# Patient Record
Sex: FEMALE | Race: WHITE | NOT HISPANIC OR LATINO
[De-identification: names, ages, dates, MRNs, and addresses within clinical notes are randomized per-mention and may not be internally consistent; named-entity substitution may affect disease eponyms.]

---

## 2019-03-26 ENCOUNTER — TRANSCRIPTION ENCOUNTER (OUTPATIENT)
Age: 24
End: 2019-03-26

## 2019-05-07 ENCOUNTER — TRANSCRIPTION ENCOUNTER (OUTPATIENT)
Age: 24
End: 2019-05-07

## 2019-05-11 ENCOUNTER — TRANSCRIPTION ENCOUNTER (OUTPATIENT)
Age: 24
End: 2019-05-11

## 2020-04-14 ENCOUNTER — INPATIENT (INPATIENT)
Facility: HOSPITAL | Age: 25
LOS: 1 days | Discharge: HOME | DRG: 373 | End: 2020-04-16
Attending: TRANSPLANT SURGERY | Admitting: TRANSPLANT SURGERY
Payer: COMMERCIAL

## 2020-04-14 VITALS
SYSTOLIC BLOOD PRESSURE: 108 MMHG | RESPIRATION RATE: 18 BRPM | TEMPERATURE: 99 F | HEART RATE: 105 BPM | OXYGEN SATURATION: 97 % | HEIGHT: 62 IN | WEIGHT: 134.92 LBS | DIASTOLIC BLOOD PRESSURE: 73 MMHG

## 2020-04-14 DIAGNOSIS — K35.32 ACUTE APPENDICITIS WITH PERFORATION, LOCALIZED PERITONITIS, AND GANGRENE, WITHOUT ABSCESS: ICD-10-CM

## 2020-04-14 LAB
ALBUMIN SERPL ELPH-MCNC: 4.2 G/DL — SIGNIFICANT CHANGE UP (ref 3.3–5)
ALP SERPL-CCNC: 58 U/L — SIGNIFICANT CHANGE UP (ref 40–120)
ALT FLD-CCNC: 16 U/L — SIGNIFICANT CHANGE UP (ref 10–45)
ANION GAP SERPL CALC-SCNC: 13 MMOL/L — SIGNIFICANT CHANGE UP (ref 5–17)
APPEARANCE UR: CLEAR — SIGNIFICANT CHANGE UP
AST SERPL-CCNC: 28 U/L — SIGNIFICANT CHANGE UP (ref 10–40)
BACTERIA # UR AUTO: NEGATIVE — SIGNIFICANT CHANGE UP
BASOPHILS # BLD AUTO: 0.13 K/UL — SIGNIFICANT CHANGE UP (ref 0–0.2)
BASOPHILS NFR BLD AUTO: 0.8 % — SIGNIFICANT CHANGE UP (ref 0–2)
BILIRUB SERPL-MCNC: 0.4 MG/DL — SIGNIFICANT CHANGE UP (ref 0.2–1.2)
BILIRUB UR-MCNC: NEGATIVE — SIGNIFICANT CHANGE UP
BUN SERPL-MCNC: 12 MG/DL — SIGNIFICANT CHANGE UP (ref 7–23)
CALCIUM SERPL-MCNC: 9.6 MG/DL — SIGNIFICANT CHANGE UP (ref 8.4–10.5)
CHLORIDE SERPL-SCNC: 101 MMOL/L — SIGNIFICANT CHANGE UP (ref 96–108)
CO2 SERPL-SCNC: 25 MMOL/L — SIGNIFICANT CHANGE UP (ref 22–31)
COLOR SPEC: YELLOW — SIGNIFICANT CHANGE UP
CREAT SERPL-MCNC: 0.64 MG/DL — SIGNIFICANT CHANGE UP (ref 0.5–1.3)
DIFF PNL FLD: NEGATIVE — SIGNIFICANT CHANGE UP
EOSINOPHIL # BLD AUTO: 0 K/UL — SIGNIFICANT CHANGE UP (ref 0–0.5)
EOSINOPHIL NFR BLD AUTO: 0 % — SIGNIFICANT CHANGE UP (ref 0–6)
EPI CELLS # UR: 4 /HPF — SIGNIFICANT CHANGE UP
GAS PNL BLDV: SIGNIFICANT CHANGE UP
GLUCOSE SERPL-MCNC: 95 MG/DL — SIGNIFICANT CHANGE UP (ref 70–99)
GLUCOSE UR QL: NEGATIVE — SIGNIFICANT CHANGE UP
HCT VFR BLD CALC: 36.1 % — SIGNIFICANT CHANGE UP (ref 34.5–45)
HGB BLD-MCNC: 11.3 G/DL — LOW (ref 11.5–15.5)
HYALINE CASTS # UR AUTO: 1 /LPF — SIGNIFICANT CHANGE UP (ref 0–2)
KETONES UR-MCNC: SIGNIFICANT CHANGE UP
LEUKOCYTE ESTERASE UR-ACNC: ABNORMAL
LIDOCAIN IGE QN: 29 U/L — SIGNIFICANT CHANGE UP (ref 7–60)
LYMPHOCYTES # BLD AUTO: 1.44 K/UL — SIGNIFICANT CHANGE UP (ref 1–3.3)
LYMPHOCYTES # BLD AUTO: 8.7 % — LOW (ref 13–44)
MCHC RBC-ENTMCNC: 23.3 PG — LOW (ref 27–34)
MCHC RBC-ENTMCNC: 31.3 GM/DL — LOW (ref 32–36)
MCV RBC AUTO: 74.4 FL — LOW (ref 80–100)
MONOCYTES # BLD AUTO: 0.58 K/UL — SIGNIFICANT CHANGE UP (ref 0–0.9)
MONOCYTES NFR BLD AUTO: 3.5 % — SIGNIFICANT CHANGE UP (ref 2–14)
NEUTROPHILS # BLD AUTO: 14.24 K/UL — HIGH (ref 1.8–7.4)
NEUTROPHILS NFR BLD AUTO: 85.2 % — HIGH (ref 43–77)
NITRITE UR-MCNC: NEGATIVE — SIGNIFICANT CHANGE UP
PH UR: 6.5 — SIGNIFICANT CHANGE UP (ref 5–8)
PLATELET # BLD AUTO: 363 K/UL — SIGNIFICANT CHANGE UP (ref 150–400)
POTASSIUM SERPL-MCNC: 4 MMOL/L — SIGNIFICANT CHANGE UP (ref 3.5–5.3)
POTASSIUM SERPL-SCNC: 4 MMOL/L — SIGNIFICANT CHANGE UP (ref 3.5–5.3)
PROT SERPL-MCNC: 8.1 G/DL — SIGNIFICANT CHANGE UP (ref 6–8.3)
PROT UR-MCNC: ABNORMAL
RBC # BLD: 4.85 M/UL — SIGNIFICANT CHANGE UP (ref 3.8–5.2)
RBC # FLD: 16.1 % — HIGH (ref 10.3–14.5)
RBC CASTS # UR COMP ASSIST: 2 /HPF — SIGNIFICANT CHANGE UP (ref 0–4)
SODIUM SERPL-SCNC: 139 MMOL/L — SIGNIFICANT CHANGE UP (ref 135–145)
SP GR SPEC: 1.03 — HIGH (ref 1.01–1.02)
UROBILINOGEN FLD QL: NEGATIVE — SIGNIFICANT CHANGE UP
WBC # BLD: 16.54 K/UL — HIGH (ref 3.8–10.5)
WBC # FLD AUTO: 16.54 K/UL — HIGH (ref 3.8–10.5)
WBC UR QL: 17 /HPF — HIGH (ref 0–5)

## 2020-04-14 PROCEDURE — 74177 CT ABD & PELVIS W/CONTRAST: CPT | Mod: 26

## 2020-04-14 PROCEDURE — 99285 EMERGENCY DEPT VISIT HI MDM: CPT

## 2020-04-14 PROCEDURE — 99222 1ST HOSP IP/OBS MODERATE 55: CPT | Mod: GC

## 2020-04-14 RX ORDER — OXYCODONE AND ACETAMINOPHEN 5; 325 MG/1; MG/1
2 TABLET ORAL EVERY 6 HOURS
Refills: 0 | Status: DISCONTINUED | OUTPATIENT
Start: 2020-04-14 | End: 2020-04-16

## 2020-04-14 RX ORDER — ENOXAPARIN SODIUM 100 MG/ML
40 INJECTION SUBCUTANEOUS DAILY
Refills: 0 | Status: DISCONTINUED | OUTPATIENT
Start: 2020-04-14 | End: 2020-04-16

## 2020-04-14 RX ORDER — KETOROLAC TROMETHAMINE 30 MG/ML
15 SYRINGE (ML) INJECTION ONCE
Refills: 0 | Status: DISCONTINUED | OUTPATIENT
Start: 2020-04-14 | End: 2020-04-14

## 2020-04-14 RX ORDER — SODIUM CHLORIDE 9 MG/ML
1000 INJECTION INTRAMUSCULAR; INTRAVENOUS; SUBCUTANEOUS ONCE
Refills: 0 | Status: COMPLETED | OUTPATIENT
Start: 2020-04-14 | End: 2020-04-14

## 2020-04-14 RX ORDER — ERTAPENEM SODIUM 1 G/1
1000 INJECTION, POWDER, LYOPHILIZED, FOR SOLUTION INTRAMUSCULAR; INTRAVENOUS ONCE
Refills: 0 | Status: DISCONTINUED | OUTPATIENT
Start: 2020-04-14 | End: 2020-04-14

## 2020-04-14 RX ORDER — SODIUM CHLORIDE 9 MG/ML
1000 INJECTION INTRAMUSCULAR; INTRAVENOUS; SUBCUTANEOUS
Refills: 0 | Status: DISCONTINUED | OUTPATIENT
Start: 2020-04-14 | End: 2020-04-15

## 2020-04-14 RX ORDER — PIPERACILLIN AND TAZOBACTAM 4; .5 G/20ML; G/20ML
3.38 INJECTION, POWDER, LYOPHILIZED, FOR SOLUTION INTRAVENOUS ONCE
Refills: 0 | Status: COMPLETED | OUTPATIENT
Start: 2020-04-14 | End: 2020-04-14

## 2020-04-14 RX ORDER — ONDANSETRON 8 MG/1
4 TABLET, FILM COATED ORAL EVERY 6 HOURS
Refills: 0 | Status: DISCONTINUED | OUTPATIENT
Start: 2020-04-14 | End: 2020-04-16

## 2020-04-14 RX ORDER — OXYCODONE AND ACETAMINOPHEN 5; 325 MG/1; MG/1
1 TABLET ORAL EVERY 4 HOURS
Refills: 0 | Status: DISCONTINUED | OUTPATIENT
Start: 2020-04-14 | End: 2020-04-16

## 2020-04-14 RX ADMIN — Medication 15 MILLIGRAM(S): at 21:15

## 2020-04-14 RX ADMIN — SODIUM CHLORIDE 1000 MILLILITER(S): 9 INJECTION INTRAMUSCULAR; INTRAVENOUS; SUBCUTANEOUS at 21:16

## 2020-04-14 RX ADMIN — PIPERACILLIN AND TAZOBACTAM 200 GRAM(S): 4; .5 INJECTION, POWDER, LYOPHILIZED, FOR SOLUTION INTRAVENOUS at 23:59

## 2020-04-14 NOTE — ED PROVIDER NOTE - CLINICAL SUMMARY MEDICAL DECISION MAKING FREE TEXT BOX
25 y.o female, no significant pmhx c/o RLQ abdominal pain r/o Appendicitis vs acute intra-abdominal pathology. 25 y.o female, no significant pmhx c/o RLQ abdominal pain r/o Appendicitis vs acute intra-abdominal pathology.  Courtney: 25 year old female with rlq abdominal pain x 1 day. + n/v, no fever, no chills, will get labs, ct a/p r/o appendcitis.   reassess

## 2020-04-14 NOTE — H&P ADULT - NSHPLABSRESULTS_GEN_ALL_CORE
CBC ( @ 20:47)                          11.3<L>                   16.54<H>  )--------------(  363        85.2<H>% Neuts, 8.7<L>% Lymphs, ANC: 14.24<H>                          36.1      BMP ( @ 20:47)       139     |  101     |  12    			Ca++ --      Ca 9.6          ---------------------------------( 95    		Mg --           4.0     |  25      |  0.64  			Ph --        LFTs ( @ 20:47)      TPro 8.1 / Alb 4.2 / TBili 0.4 / DBili -- / AST 28 / ALT 16 / AlkPhos 58          VBG ( @ 20:46)     7.36 / 50 / 27 / 28 / 2.1<H> / 40<L>%      Lactate: 1.5    Urinalysis ( @ 20:47):     Color: Yellow / Appearance: Clear / S.030<H> / pH: 6.5 / Gluc: Negative / Ketones: Trace / Bili: Negative / Urobili: Negative / Protein :Trace<!> / Nitrites: Negative / Leuk.Est: Large<!> / RBC: 2 / WBC: 17<H> / Sq Epi:  / Non Sq Epi: 4 / Bacteria Negative                   EXAM:  CT ABDOMEN AND PELVIS IC                          PROCEDURE DATE:  2020      INTERPRETATION:  CLINICAL INFORMATION: Right lower quadrant abdominal pain. Eval for appendicitis.    COMPARISON: None.    PROCEDURE:   CT of the Abdomen and Pelvis was performed with intravenous contrast.   Intravenous contrast: 90 ml Omnipaque 350. 10 ml discarded.  Oral contrast: None.  Sagittal and coronal reformats were performed.    FINDINGS:    LOWER CHEST: Clear lung bases.    LIVER: Within normal limits.  BILE DUCTS: Normal caliber.  GALLBLADDER: Within normal limits.  SPLEEN: Within normal limits.  PANCREAS: Within normal limits.  ADRENALS: Within normal limits.  KIDNEYS/URETERS: Within normal limits.    BLADDER: Decompressed, limiting evaluation  REPRODUCTIVE ORGANS: Unremarkable for age    BOWEL: No bowel obstruction. Appendix is thickened up to 1.3 cm with wall thickening and surrounding inflammatory changes consistent with acute appendicitis. Focal free fluid in the right pelvis adjacent to the tip, without organization.  PERITONEUM: No intraperitoneal free air.  VESSELS: Normal in caliber and patent  RETROPERITONEUM/LYMPH NODES: No enlarged lymph nodes measuring greater than 10 mm in short axis.    ABDOMINAL WALL: Intact  BONES: Unremarkable for age    IMPRESSION:     1. Acute appendicitis without free air or organized abscess. Focal free fluid adjacent to the tip may be reactive, microperforation not excluded.    NOLBERTO WATSON M.D., RADIOLOGY RESIDENT  This document has been electronically signed.  CONRADO HILL M.D., ATTENDING RADIOLOGIST  This document has been electronically signed. 2020 10:51PM

## 2020-04-14 NOTE — H&P ADULT - HISTORY OF PRESENT ILLNESS
26yo woman with no PMH presents for abdominal pain found to have acute appendicitis on ED CT. Pain started yesterday around umbilicus and is now focally tender in RLQ. Pain is described as sharp and constant. She has tried tylenol with some relief. She reports nausea and vomiting since yesterday evening. No fevers, chills, chest pain, SOB, cough. No sick contacts. Last ate today at 16:00.  In ED, WBC elevated at 16.5, afebrile, HD stable. CT showed Acute appendicitis without free air or organized abscess. Focal free fluid adjacent to the tip may be reactive, microperforation not excluded.

## 2020-04-14 NOTE — H&P ADULT - NSHPPHYSICALEXAM_GEN_ALL_CORE
T(C): 37.1 (04-14-20 @ 19:51), Max: 37.2 (04-14-20 @ 19:04)  HR: 97 (04-14-20 @ 19:51) (97 - 105)  BP: 110/73 (04-14-20 @ 19:51) (108/73 - 110/73)  BP(mean): --  ABP: --  ABP(mean): --  RR: 18 (04-14-20 @ 19:51) (18 - 18)  SpO2: 98% (04-14-20 @ 19:51) (97% - 98%)  Wt(kg): --  CVP(mm Hg): --  CI: --  CAPILLARY BLOOD GLUCOSE       N/A        General: NAD  Respiratory: respirations unlabored  CVS: regular rate and rhythm  Abdomen: soft, nondistended, +RLQ TTP, no rebound, no guarding  Extremities: no edema  Skin: warm, dry, appropriate color

## 2020-04-14 NOTE — ED PROVIDER NOTE - CARE PLAN
Principal Discharge DX:	Acute appendicitis with perforation and localized peritonitis, without abscess or gangrene

## 2020-04-14 NOTE — ED PROVIDER NOTE - ABDOMINAL EXAM
RLQ tenderness to palpation. (-) Rovsing sign, (-) Obturator sign, (-) Psoas sign/MC[place]S POINT TENDERNESS/nondistended/tender...

## 2020-04-14 NOTE — ED PROVIDER NOTE - OBJECTIVE STATEMENT
Patient is 25 y.o female, denies significant pmhx presents to ED c/o RLQ abdominal pain x 5 pm yesterday. Pt describes pain as sharp, constant and reports it originated at umbilicus, but has not moved to RLQ. Pt reports having 2-3 vomiting episodes yesterday w/ nausea that has since resolved. Pt denies fever, chills, chest pain, sob, dyspnea, dysuria, hematuria, vaginal discharge, back pain, trauma, The patient has not recently traveled to any of the current high risk areas and has not been in close contact with a known positive COVID19-infected patient.

## 2020-04-14 NOTE — ED ADULT NURSE NOTE - OBJECTIVE STATEMENT
Patient is a 25  year old female complaining of abdominal pain. Pt denies past medical history. Patient is A&O x 4. Pt reports rlq pain starting yesterday with multiple episodes of vomiting. pt states pain radiates to belly button. Denies complaints of chest pain, sob, fevers, chills, diarrhea, headache, syncope, burning urination, blood in urine, blood in stool. Abd is soft, tender to RLQ, non distended. Skin is warm and dry. Color is consistent with ethnicity. Safety and comfort maintained. Will continue to monitor.

## 2020-04-14 NOTE — H&P ADULT - ASSESSMENT
26yo woman with no PMH presents with uncomplicated acute appendicitis. No appendicolith on CT.    Plan  - Admit to Dr. Lopez, gen surg  - Conservative management with antibiotics  - Zosyn  - NPO, IVF  - AM CBC, BMP, magnesium, phosphate  - DVT ppx  - Monitor abdominal exams  - OOB/ambi    [id number] 24yo woman with no PMH presents with uncomplicated acute appendicitis. No appendicolith on CT.    Plan  - Admit to Dr. Lopez, gen surg  - Conservative management with antibiotics  - Zosyn  - NPO, IVF  - AM CBC, BMP, magnesium, phosphate  - DVT ppx  - Monitor abdominal exams  - OOB/ambi    blue surgery, [id number] 26yo woman with no PMH presents with uncomplicated acute appendicitis. No appendicolith on CT.    Plan  - Admit to Dr. Lopez, gen surg  - Conservative management with antibiotics  - Zosyn  - NPO, IVF  - AM CBC, BMP, magnesium, phosphate  - DVT ppx  - Monitor abdominal exams  - OOB/ambi    blue surgery, [id number]

## 2020-04-15 LAB
ANION GAP SERPL CALC-SCNC: 11 MMOL/L — SIGNIFICANT CHANGE UP (ref 5–17)
BASOPHILS # BLD AUTO: 0.05 K/UL — SIGNIFICANT CHANGE UP (ref 0–0.2)
BASOPHILS NFR BLD AUTO: 0.5 % — SIGNIFICANT CHANGE UP (ref 0–2)
BUN SERPL-MCNC: 12 MG/DL — SIGNIFICANT CHANGE UP (ref 7–23)
CALCIUM SERPL-MCNC: 8.7 MG/DL — SIGNIFICANT CHANGE UP (ref 8.4–10.5)
CHLORIDE SERPL-SCNC: 104 MMOL/L — SIGNIFICANT CHANGE UP (ref 96–108)
CO2 SERPL-SCNC: 21 MMOL/L — LOW (ref 22–31)
CREAT SERPL-MCNC: 0.68 MG/DL — SIGNIFICANT CHANGE UP (ref 0.5–1.3)
EOSINOPHIL # BLD AUTO: 0.13 K/UL — SIGNIFICANT CHANGE UP (ref 0–0.5)
EOSINOPHIL NFR BLD AUTO: 1.4 % — SIGNIFICANT CHANGE UP (ref 0–6)
GLUCOSE SERPL-MCNC: 88 MG/DL — SIGNIFICANT CHANGE UP (ref 70–99)
HCT VFR BLD CALC: 31.6 % — LOW (ref 34.5–45)
HCT VFR BLD CALC: 35.3 % — SIGNIFICANT CHANGE UP (ref 34.5–45)
HGB BLD-MCNC: 10.6 G/DL — LOW (ref 11.5–15.5)
HGB BLD-MCNC: 9.5 G/DL — LOW (ref 11.5–15.5)
IMM GRANULOCYTES NFR BLD AUTO: 0.3 % — SIGNIFICANT CHANGE UP (ref 0–1.5)
LYMPHOCYTES # BLD AUTO: 1.95 K/UL — SIGNIFICANT CHANGE UP (ref 1–3.3)
LYMPHOCYTES # BLD AUTO: 21.1 % — SIGNIFICANT CHANGE UP (ref 13–44)
MAGNESIUM SERPL-MCNC: 2.1 MG/DL — SIGNIFICANT CHANGE UP (ref 1.6–2.6)
MCHC RBC-ENTMCNC: 22.7 PG — LOW (ref 27–34)
MCHC RBC-ENTMCNC: 22.8 PG — LOW (ref 27–34)
MCHC RBC-ENTMCNC: 30 GM/DL — LOW (ref 32–36)
MCHC RBC-ENTMCNC: 30.1 GM/DL — LOW (ref 32–36)
MCV RBC AUTO: 75.8 FL — LOW (ref 80–100)
MCV RBC AUTO: 75.8 FL — LOW (ref 80–100)
MONOCYTES # BLD AUTO: 0.73 K/UL — SIGNIFICANT CHANGE UP (ref 0–0.9)
MONOCYTES NFR BLD AUTO: 7.9 % — SIGNIFICANT CHANGE UP (ref 2–14)
NEUTROPHILS # BLD AUTO: 6.36 K/UL — SIGNIFICANT CHANGE UP (ref 1.8–7.4)
NEUTROPHILS NFR BLD AUTO: 68.8 % — SIGNIFICANT CHANGE UP (ref 43–77)
NRBC # BLD: 0 /100 WBCS — SIGNIFICANT CHANGE UP (ref 0–0)
NRBC # BLD: 0 /100 WBCS — SIGNIFICANT CHANGE UP (ref 0–0)
PHOSPHATE SERPL-MCNC: 3 MG/DL — SIGNIFICANT CHANGE UP (ref 2.5–4.5)
PLATELET # BLD AUTO: 292 K/UL — SIGNIFICANT CHANGE UP (ref 150–400)
PLATELET # BLD AUTO: 314 K/UL — SIGNIFICANT CHANGE UP (ref 150–400)
POTASSIUM SERPL-MCNC: 3.5 MMOL/L — SIGNIFICANT CHANGE UP (ref 3.5–5.3)
POTASSIUM SERPL-SCNC: 3.5 MMOL/L — SIGNIFICANT CHANGE UP (ref 3.5–5.3)
RBC # BLD: 4.17 M/UL — SIGNIFICANT CHANGE UP (ref 3.8–5.2)
RBC # BLD: 4.66 M/UL — SIGNIFICANT CHANGE UP (ref 3.8–5.2)
RBC # FLD: 15.9 % — HIGH (ref 10.3–14.5)
RBC # FLD: 16 % — HIGH (ref 10.3–14.5)
SODIUM SERPL-SCNC: 136 MMOL/L — SIGNIFICANT CHANGE UP (ref 135–145)
WBC # BLD: 8.14 K/UL — SIGNIFICANT CHANGE UP (ref 3.8–10.5)
WBC # BLD: 9.25 K/UL — SIGNIFICANT CHANGE UP (ref 3.8–10.5)
WBC # FLD AUTO: 8.14 K/UL — SIGNIFICANT CHANGE UP (ref 3.8–10.5)
WBC # FLD AUTO: 9.25 K/UL — SIGNIFICANT CHANGE UP (ref 3.8–10.5)

## 2020-04-15 PROCEDURE — 99232 SBSQ HOSP IP/OBS MODERATE 35: CPT | Mod: GC

## 2020-04-15 RX ORDER — SODIUM CHLORIDE 9 MG/ML
1000 INJECTION, SOLUTION INTRAVENOUS
Refills: 0 | Status: DISCONTINUED | OUTPATIENT
Start: 2020-04-15 | End: 2020-04-16

## 2020-04-15 RX ORDER — PIPERACILLIN AND TAZOBACTAM 4; .5 G/20ML; G/20ML
3.38 INJECTION, POWDER, LYOPHILIZED, FOR SOLUTION INTRAVENOUS EVERY 8 HOURS
Refills: 0 | Status: DISCONTINUED | OUTPATIENT
Start: 2020-04-15 | End: 2020-04-16

## 2020-04-15 RX ADMIN — ONDANSETRON 4 MILLIGRAM(S): 8 TABLET, FILM COATED ORAL at 14:29

## 2020-04-15 RX ADMIN — OXYCODONE AND ACETAMINOPHEN 1 TABLET(S): 5; 325 TABLET ORAL at 03:04

## 2020-04-15 RX ADMIN — PIPERACILLIN AND TAZOBACTAM 25 GRAM(S): 4; .5 INJECTION, POWDER, LYOPHILIZED, FOR SOLUTION INTRAVENOUS at 05:26

## 2020-04-15 RX ADMIN — ENOXAPARIN SODIUM 40 MILLIGRAM(S): 100 INJECTION SUBCUTANEOUS at 12:34

## 2020-04-15 RX ADMIN — PIPERACILLIN AND TAZOBACTAM 25 GRAM(S): 4; .5 INJECTION, POWDER, LYOPHILIZED, FOR SOLUTION INTRAVENOUS at 22:30

## 2020-04-15 RX ADMIN — PIPERACILLIN AND TAZOBACTAM 25 GRAM(S): 4; .5 INJECTION, POWDER, LYOPHILIZED, FOR SOLUTION INTRAVENOUS at 14:30

## 2020-04-15 NOTE — PROGRESS NOTE ADULT - SUBJECTIVE AND OBJECTIVE BOX
Transplant Surgery     Patient seen and examined with  Dr. Lopez, and MARS Nash. Disciplines not in attendance will be notified of the plan.     26yo woman with no PMH presents for abdominal pain found to have acute appendicitis on ED CT. Pain started yesterday around umbilicus and is now focally tender in RLQ. Pain is described as sharp and constant. She has tried tylenol with some relief. She reports nausea and vomiting since yesterday evening. No fevers, chills, chest pain, SOB, cough. No sick contacts. Last ate today at 16:00.  In ED, WBC elevated at 16.5, afebrile, HD stable. CT showed Acute appendicitis without free air or organized abscess. Focal free fluid adjacent to the tip may be reactive, microperforation not excluded.    Interval Events:  -CT w/ acute appendicitis w/o free air organized abscess.  -Pt was made NPO, started on zosyn.   -on NS @ 75cc/hr  -WBC down to 9.5 from 16.5  -Pt reports significant improvement in pain and states that she has an appetite.   -UA + for leuks and WBCs. Urine cx sent. Denies dysuria.       Education:  Medications    Plan of care:  See Below    MEDICATIONS  (STANDING):  enoxaparin Injectable 40 milliGRAM(s) SubCutaneous daily  lactated ringers. 1000 milliLiter(s) (100 mL/Hr) IV Continuous <Continuous>  piperacillin/tazobactam IVPB.. 3.375 Gram(s) IV Intermittent every 8 hours    MEDICATIONS  (PRN):  ondansetron Injectable 4 milliGRAM(s) IV Push every 6 hours PRN Nausea  oxycodone    5 mG/acetaminophen 325 mG 1 Tablet(s) Oral every 4 hours PRN Moderate Pain (4 - 6)  oxycodone    5 mG/acetaminophen 325 mG 2 Tablet(s) Oral every 6 hours PRN Severe Pain (7 - 10)      PAST MEDICAL & SURGICAL HISTORY:  No pertinent past medical history  No significant past surgical history      Vital Signs Last 24 Hrs  T(C): 36.3 (15 Apr 2020 08:58), Max: 37.2 (14 Apr 2020 19:04)  T(F): 97.4 (15 Apr 2020 08:58), Max: 99 (14 Apr 2020 19:04)  HR: 73 (15 Apr 2020 08:58) (73 - 105)  BP: 99/60 (15 Apr 2020 08:58) (92/56 - 110/73)  BP(mean): --  RR: 18 (15 Apr 2020 08:58) (16 - 18)  SpO2: 98% (15 Apr 2020 08:58) (97% - 100%)    I&O's Summary                            9.5    9.25  )-----------( 292      ( 15 Apr 2020 06:30 )             31.6     04-15    136  |  104  |  12  ----------------------------<  88  3.5   |  21<L>  |  0.68    Ca    8.7      15 Apr 2020 06:29  Phos  3.0     04-15  Mg     2.1     04-15    TPro  8.1  /  Alb  4.2  /  TBili  0.4  /  DBili  x   /  AST  28  /  ALT  16  /  AlkPhos  58  04-14      Review of systems  Gen: No weight changes, fatigue, fevers/chills, weakness  Skin: No rashes  Head/Eyes/Ears/Mouth: No headache; Normal hearing; Normal vision w/o blurriness; No sinus pain/discomfort, sore throat  Respiratory: No dyspnea, cough, wheezing, hemoptysis  CV: No chest pain, PND, orthopnea  GI: Mild abdominal pain in RLQ. denies diarrhea, constipation, nausea, vomiting, melena, hematochezia  : No increased frequency, dysuria, hematuria, nocturia  MSK: No joint pain/swelling; no back pain; no edema  Neuro: No dizziness/lightheadedness, weakness, seizures, numbness, tingling  Heme: No easy bruising or bleeding  Endo: No heat/cold intolerance  Psych: No significant nervousness, anxiety, stress, depression  All other systems were reviewed and are negative, except as noted.      PHYSICAL EXAM:  General: NAD  Respiratory: respirations unlabored  CVS: regular rate and rhythm  Abdomen: soft, nondistended, +mild RLQ TTP, no rebound, no guarding  Extremities: no edema  Skin: warm, dry, appropriate color      EXAM:  CT ABDOMEN AND PELVIS IC                          	PROCEDURE DATE:  04/14/2020      	INTERPRETATION:  CLINICAL INFORMATION: Right lower quadrant abdominal pain. Eval for appendicitis.    	COMPARISON: None.    	PROCEDURE:   	CT of the Abdomen and Pelvis was performed with intravenous contrast.   	Intravenous contrast: 90 ml Omnipaque 350. 10 ml discarded.  	Oral contrast: None.  	Sagittal and coronal reformats were performed.    	FINDINGS:    	LOWER CHEST: Clear lung bases.    	LIVER: Within normal limits.  	BILE DUCTS: Normal caliber.  	GALLBLADDER: Within normal limits.  	SPLEEN: Within normal limits.  	PANCREAS: Within normal limits.  	ADRENALS: Within normal limits.  	KIDNEYS/URETERS: Within normal limits.    	BLADDER: Decompressed, limiting evaluation  	REPRODUCTIVE ORGANS: Unremarkable for age    	BOWEL: No bowel obstruction. Appendix is thickened up to 1.3 cm with wall thickening and surrounding inflammatory changes consistent with acute appendicitis. Focal free fluid in the right pelvis adjacent to the tip, without organization.  	PERITONEUM: No intraperitoneal free air.  	VESSELS: Normal in caliber and patent  	RETROPERITONEUM/LYMPH NODES: No enlarged lymph nodes measuring greater than 10 mm in short axis.    	ABDOMINAL WALL: Intact  	BONES: Unremarkable for age    MPRESSION:     	1. Acute appendicitis without free air or organized abscess. Focal free fluid adjacent to the tip may be reactive, microperforation not excluded.

## 2020-04-15 NOTE — PROGRESS NOTE ADULT - ATTENDING COMMENTS
24yo F with acute appendicitis.  Focally tender in RLQ.  Reports improved pain this AM.  Receiving IV abx with plan for non-operative management considering decreased OR usage in light of the COVID pandemic.      We will increase her PO throughout the day.  Should she tolerate a diet, she can be discharged on a course of abx.   We discussed her contacting me or returning to the ER should her symptoms worsen while on antibiotics.

## 2020-04-15 NOTE — PROGRESS NOTE ADULT - ASSESSMENT
24yo woman with no PMH presents with uncomplicated acute appendicitis. No appendicolith on CT. She was made NPO and started on abx/IVF last night. WBC down to 9.5 from 16.5 and she appears to have improved clinically.    Plan  - Resume abx/IVF  -will start on reg diet; if tolerates will dc home on abx w/ follow up in 2 days  -DVT ppx

## 2020-04-16 ENCOUNTER — TRANSCRIPTION ENCOUNTER (OUTPATIENT)
Age: 25
End: 2020-04-16

## 2020-04-16 VITALS
RESPIRATION RATE: 18 BRPM | HEART RATE: 78 BPM | SYSTOLIC BLOOD PRESSURE: 104 MMHG | OXYGEN SATURATION: 98 % | TEMPERATURE: 98 F | DIASTOLIC BLOOD PRESSURE: 72 MMHG

## 2020-04-16 LAB
ANION GAP SERPL CALC-SCNC: 13 MMOL/L — SIGNIFICANT CHANGE UP (ref 5–17)
BASOPHILS # BLD AUTO: 0.05 K/UL — SIGNIFICANT CHANGE UP (ref 0–0.2)
BASOPHILS NFR BLD AUTO: 0.9 % — SIGNIFICANT CHANGE UP (ref 0–2)
BUN SERPL-MCNC: 15 MG/DL — SIGNIFICANT CHANGE UP (ref 7–23)
CALCIUM SERPL-MCNC: 9.3 MG/DL — SIGNIFICANT CHANGE UP (ref 8.4–10.5)
CHLORIDE SERPL-SCNC: 103 MMOL/L — SIGNIFICANT CHANGE UP (ref 96–108)
CO2 SERPL-SCNC: 22 MMOL/L — SIGNIFICANT CHANGE UP (ref 22–31)
CREAT SERPL-MCNC: 0.77 MG/DL — SIGNIFICANT CHANGE UP (ref 0.5–1.3)
CULTURE RESULTS: NO GROWTH — SIGNIFICANT CHANGE UP
EOSINOPHIL # BLD AUTO: 0.15 K/UL — SIGNIFICANT CHANGE UP (ref 0–0.5)
EOSINOPHIL NFR BLD AUTO: 2.7 % — SIGNIFICANT CHANGE UP (ref 0–6)
GLUCOSE SERPL-MCNC: 58 MG/DL — LOW (ref 70–99)
HCT VFR BLD CALC: 31.5 % — LOW (ref 34.5–45)
HGB BLD-MCNC: 9.5 G/DL — LOW (ref 11.5–15.5)
IMM GRANULOCYTES NFR BLD AUTO: 0.2 % — SIGNIFICANT CHANGE UP (ref 0–1.5)
LYMPHOCYTES # BLD AUTO: 1.64 K/UL — SIGNIFICANT CHANGE UP (ref 1–3.3)
LYMPHOCYTES # BLD AUTO: 30 % — SIGNIFICANT CHANGE UP (ref 13–44)
MAGNESIUM SERPL-MCNC: 1.9 MG/DL — SIGNIFICANT CHANGE UP (ref 1.6–2.6)
MCHC RBC-ENTMCNC: 22.8 PG — LOW (ref 27–34)
MCHC RBC-ENTMCNC: 30.2 GM/DL — LOW (ref 32–36)
MCV RBC AUTO: 75.7 FL — LOW (ref 80–100)
MONOCYTES # BLD AUTO: 0.56 K/UL — SIGNIFICANT CHANGE UP (ref 0–0.9)
MONOCYTES NFR BLD AUTO: 10.3 % — SIGNIFICANT CHANGE UP (ref 2–14)
NEUTROPHILS # BLD AUTO: 3.05 K/UL — SIGNIFICANT CHANGE UP (ref 1.8–7.4)
NEUTROPHILS NFR BLD AUTO: 55.9 % — SIGNIFICANT CHANGE UP (ref 43–77)
NRBC # BLD: 0 /100 WBCS — SIGNIFICANT CHANGE UP (ref 0–0)
PHOSPHATE SERPL-MCNC: 3.4 MG/DL — SIGNIFICANT CHANGE UP (ref 2.5–4.5)
PLATELET # BLD AUTO: 288 K/UL — SIGNIFICANT CHANGE UP (ref 150–400)
POTASSIUM SERPL-MCNC: 3.5 MMOL/L — SIGNIFICANT CHANGE UP (ref 3.5–5.3)
POTASSIUM SERPL-SCNC: 3.5 MMOL/L — SIGNIFICANT CHANGE UP (ref 3.5–5.3)
RBC # BLD: 4.16 M/UL — SIGNIFICANT CHANGE UP (ref 3.8–5.2)
RBC # FLD: 15.7 % — HIGH (ref 10.3–14.5)
SODIUM SERPL-SCNC: 138 MMOL/L — SIGNIFICANT CHANGE UP (ref 135–145)
SPECIMEN SOURCE: SIGNIFICANT CHANGE UP
WBC # BLD: 5.46 K/UL — SIGNIFICANT CHANGE UP (ref 3.8–10.5)
WBC # FLD AUTO: 5.46 K/UL — SIGNIFICANT CHANGE UP (ref 3.8–10.5)

## 2020-04-16 PROCEDURE — 99285 EMERGENCY DEPT VISIT HI MDM: CPT | Mod: 25

## 2020-04-16 PROCEDURE — 81001 URINALYSIS AUTO W/SCOPE: CPT

## 2020-04-16 PROCEDURE — 83690 ASSAY OF LIPASE: CPT

## 2020-04-16 PROCEDURE — 84132 ASSAY OF SERUM POTASSIUM: CPT

## 2020-04-16 PROCEDURE — 84295 ASSAY OF SERUM SODIUM: CPT

## 2020-04-16 PROCEDURE — 87086 URINE CULTURE/COLONY COUNT: CPT

## 2020-04-16 PROCEDURE — 80048 BASIC METABOLIC PNL TOTAL CA: CPT

## 2020-04-16 PROCEDURE — 83605 ASSAY OF LACTIC ACID: CPT

## 2020-04-16 PROCEDURE — 83735 ASSAY OF MAGNESIUM: CPT

## 2020-04-16 PROCEDURE — 99238 HOSP IP/OBS DSCHRG MGMT 30/<: CPT | Mod: GC

## 2020-04-16 PROCEDURE — 82803 BLOOD GASES ANY COMBINATION: CPT

## 2020-04-16 PROCEDURE — 82947 ASSAY GLUCOSE BLOOD QUANT: CPT

## 2020-04-16 PROCEDURE — 85027 COMPLETE CBC AUTOMATED: CPT

## 2020-04-16 PROCEDURE — 82330 ASSAY OF CALCIUM: CPT

## 2020-04-16 PROCEDURE — 74177 CT ABD & PELVIS W/CONTRAST: CPT

## 2020-04-16 PROCEDURE — 85014 HEMATOCRIT: CPT

## 2020-04-16 PROCEDURE — 96374 THER/PROPH/DIAG INJ IV PUSH: CPT | Mod: XU

## 2020-04-16 PROCEDURE — 82962 GLUCOSE BLOOD TEST: CPT

## 2020-04-16 PROCEDURE — 82435 ASSAY OF BLOOD CHLORIDE: CPT

## 2020-04-16 PROCEDURE — 80053 COMPREHEN METABOLIC PANEL: CPT

## 2020-04-16 PROCEDURE — 84100 ASSAY OF PHOSPHORUS: CPT

## 2020-04-16 RX ORDER — SODIUM CHLORIDE 9 MG/ML
1000 INJECTION, SOLUTION INTRAVENOUS
Refills: 0 | Status: DISCONTINUED | OUTPATIENT
Start: 2020-04-16 | End: 2020-04-16

## 2020-04-16 RX ORDER — MAGNESIUM SULFATE 500 MG/ML
1 VIAL (ML) INJECTION ONCE
Refills: 0 | Status: COMPLETED | OUTPATIENT
Start: 2020-04-16 | End: 2020-04-16

## 2020-04-16 RX ADMIN — PIPERACILLIN AND TAZOBACTAM 25 GRAM(S): 4; .5 INJECTION, POWDER, LYOPHILIZED, FOR SOLUTION INTRAVENOUS at 06:06

## 2020-04-16 RX ADMIN — Medication 100 GRAM(S): at 08:46

## 2020-04-16 RX ADMIN — SODIUM CHLORIDE 100 MILLILITER(S): 9 INJECTION, SOLUTION INTRAVENOUS at 06:07

## 2020-04-16 NOTE — DISCHARGE NOTE PROVIDER - HOSPITAL COURSE
24 yo woman with no PMH presents with abdominal pain initially around umbilicus later focally tender in RLQ described as sharp and constant x 1 day.  Minimal relief with Tylenol. In ED, WBC 16.5, afebrile, hemodynamics stable. CT showed Acute appendicitis without free air or organized abscess - focal free fluid adjacent to the tip thought to be reactive, although microperforation could not be excluded. Non-operative medical management was decided upon in light of decreased OR usage in setting of coronavirus pandemic.  Her pain lessened with IV Zosyn and bowel rest.  She had one episode of emesis following initial diet trial, but later tolerated clear liquid diet without fail.  She remained afebrile, her WBC trended down to 5.46 and her abdomen was soft and non distended. Her hemodynamics remained stable.  She was ambulating and voiding without difficulty. She was discharged to home to complete an 8 day course of Augmentin with a telehealth appointment scheduled for the following week to discuss lap appendectomy in near future.

## 2020-04-16 NOTE — DISCHARGE NOTE NURSING/CASE MANAGEMENT/SOCIAL WORK - PATIENT PORTAL LINK FT
You can access the FollowMyHealth Patient Portal offered by [facility] by registering at the following website: [email or web address]. By joining [employer]’s FollowMyHealth portal, you will also be able to view your health information using other applications (apps) compatible with our system.

## 2020-04-16 NOTE — DISCHARGE NOTE PROVIDER - CARE PROVIDER_API CALL
Houston Lopez)  Surgery  [address] [place] [address]  Phone: [phone]  Fax: [phone]  Follow Up Time:

## 2020-04-16 NOTE — DISCHARGE NOTE NURSING/CASE MANAGEMENT/SOCIAL WORK - NSDCPEPTSTRK_GEN_ALL_CORE
Signs and symptoms of stroke/Call 911 for stroke/Stroke support groups for patients, families, and friends/Need for follow up after discharge/Prescribed medications/Risk factors for stroke/Stroke education booklet/Stroke warning signs and symptoms

## 2020-04-16 NOTE — DISCHARGE NOTE PROVIDER - NSDCFUADDAPPT_GEN_ALL_CORE_FT
1: Please call the Surgery Office to schedule a telehealth appointment with Dr. Lopez for early next week. Phone: [phone]

## 2020-04-16 NOTE — PROGRESS NOTE ADULT - ASSESSMENT
24yo woman with no PMH presents with uncomplicated acute appendicitis. No appendicolith on CT. She was made NPO and started on abx/IVF last night. WBC down to 9.5 from 16.5 and she appears to have improved clinically.    Plan  - Resume abx/IVF  -will start on reg diet; if tolerates will dc home on abx w/ follow up in 2 days  -DVT ppx 26yo woman with no PMH admitted with abdominal pain and found to have acute appendicitis treated with non-operative/medical management in setting of coronavirus pandemic, clinically improving      Plan: Acute appendicitis  - Advance diet to clear liquid.  If tolerated, will plan for discharge to home to advance diet slowly as tolerated  - Continue Zosyn and change to Augmentin at discharge to complete 8 more days  - If discharged today, will need Telehealth appointment early next week with Dr. Lopez.  Will plan for lap appendectomy in near future  -DVT ppx

## 2020-04-16 NOTE — DISCHARGE NOTE PROVIDER - NSDCCPCAREPLAN_GEN_ALL_CORE_FT
PRINCIPAL DISCHARGE DIAGNOSIS  Diagnosis: Acute appendicitis with perforation and localized peritonitis, without abscess or gangrene  Assessment and Plan of Treatment: -Continue to take your antibiotics as prescribed  -Please contact your surgeon for increased abdominal pain or tenderness, N/V, fever  - You may advance your diet slowly as tolerated  - You will need to have a telehealth appointment with Dr. Lopez next week.  He will discuss with you timing of your surgery to remove your appendix.

## 2020-04-16 NOTE — PROGRESS NOTE ADULT - SUBJECTIVE AND OBJECTIVE BOX
Transplant Surgery     Patient seen and examined with  Dr. Lopez, and NP Chhaya León. Disciplines not in attendance will be notified of the plan.     24yo woman with no PMH presents for abdominal pain found to have acute appendicitis on ED CT. Pain started yesterday around umbilicus and is now focally tender in RLQ. Pain is described as sharp and constant. She has tried tylenol with some relief. She reports nausea and vomiting since yesterday evening. No fevers, chills, chest pain, SOB, cough. No sick contacts. Last ate today at 16:00.  In ED, WBC elevated at 16.5, afebrile, HD stable. CT showed Acute appendicitis without free air or organized abscess. Focal free fluid adjacent to the tip may be reactive, microperforation not excluded.    Interval Events:  -Vomited yesterday after lunch.  NPO since.  Denies  -Pt was made NPO, started on zosyn.   -on NS @ 75cc/hr  -WBC down to 9.5 from 16.5  -Pt reports significant improvement in pain and states that she has an appetite.   -UA + for leuks and WBCs. Urine cx sent. Denies dysuria.       Education:  Medications    Plan of care:  See Below    MEDICATIONS  (STANDING):  enoxaparin Injectable 40 milliGRAM(s) SubCutaneous daily  lactated ringers. 1000 milliLiter(s) (100 mL/Hr) IV Continuous <Continuous>  piperacillin/tazobactam IVPB.. 3.375 Gram(s) IV Intermittent every 8 hours    MEDICATIONS  (PRN):  ondansetron Injectable 4 milliGRAM(s) IV Push every 6 hours PRN Nausea  oxycodone    5 mG/acetaminophen 325 mG 1 Tablet(s) Oral every 4 hours PRN Moderate Pain (4 - 6)  oxycodone    5 mG/acetaminophen 325 mG 2 Tablet(s) Oral every 6 hours PRN Severe Pain (7 - 10)      PAST MEDICAL & SURGICAL HISTORY:  No pertinent past medical history  No significant past surgical history      Vital Signs Last 24 Hrs  T(C): 36.3 (15 Apr 2020 08:58), Max: 37.2 (14 Apr 2020 19:04)  T(F): 97.4 (15 Apr 2020 08:58), Max: 99 (14 Apr 2020 19:04)  HR: 73 (15 Apr 2020 08:58) (73 - 105)  BP: 99/60 (15 Apr 2020 08:58) (92/56 - 110/73)  BP(mean): --  RR: 18 (15 Apr 2020 08:58) (16 - 18)  SpO2: 98% (15 Apr 2020 08:58) (97% - 100%)    I&O's Summary                            9.5    9.25  )-----------( 292      ( 15 Apr 2020 06:30 )             31.6     04-15    136  |  104  |  12  ----------------------------<  88  3.5   |  21<L>  |  0.68    Ca    8.7      15 Apr 2020 06:29  Phos  3.0     04-15  Mg     2.1     04-15    TPro  8.1  /  Alb  4.2  /  TBili  0.4  /  DBili  x   /  AST  28  /  ALT  16  /  AlkPhos  58  04-14      Review of systems  Gen: No weight changes, fatigue, fevers/chills, weakness  Skin: No rashes  Head/Eyes/Ears/Mouth: No headache; Normal hearing; Normal vision w/o blurriness; No sinus pain/discomfort, sore throat  Respiratory: No dyspnea, cough, wheezing, hemoptysis  CV: No chest pain, PND, orthopnea  GI: Mild abdominal pain in RLQ. denies diarrhea, constipation, nausea, vomiting, melena, hematochezia  : No increased frequency, dysuria, hematuria, nocturia  MSK: No joint pain/swelling; no back pain; no edema  Neuro: No dizziness/lightheadedness, weakness, seizures, numbness, tingling  Heme: No easy bruising or bleeding  Endo: No heat/cold intolerance  Psych: No significant nervousness, anxiety, stress, depression  All other systems were reviewed and are negative, except as noted.      PHYSICAL EXAM:  General: NAD  Respiratory: respirations unlabored  CVS: regular rate and rhythm  Abdomen: soft, nondistended, +mild RLQ TTP, no rebound, no guarding  Extremities: no edema  Skin: warm, dry, appropriate color      EXAM:  CT ABDOMEN AND PELVIS IC                          	PROCEDURE DATE:  04/14/2020      	INTERPRETATION:  CLINICAL INFORMATION: Right lower quadrant abdominal pain. Eval for appendicitis.    	COMPARISON: None.    	PROCEDURE:   	CT of the Abdomen and Pelvis was performed with intravenous contrast.   	Intravenous contrast: 90 ml Omnipaque 350. 10 ml discarded.  	Oral contrast: None.  	Sagittal and coronal reformats were performed.    	FINDINGS:    	LOWER CHEST: Clear lung bases.    	LIVER: Within normal limits.  	BILE DUCTS: Normal caliber.  	GALLBLADDER: Within normal limits.  	SPLEEN: Within normal limits.  	PANCREAS: Within normal limits.  	ADRENALS: Within normal limits.  	KIDNEYS/URETERS: Within normal limits.    	BLADDER: Decompressed, limiting evaluation  	REPRODUCTIVE ORGANS: Unremarkable for age    	BOWEL: No bowel obstruction. Appendix is thickened up to 1.3 cm with wall thickening and surrounding inflammatory changes consistent with acute appendicitis. Focal free fluid in the right pelvis adjacent to the tip, without organization.  	PERITONEUM: No intraperitoneal free air.  	VESSELS: Normal in caliber and patent  	RETROPERITONEUM/LYMPH NODES: No enlarged lymph nodes measuring greater than 10 mm in short axis.    	ABDOMINAL WALL: Intact  	BONES: Unremarkable for age    MPRESSION:     	1. Acute appendicitis without free air or organized abscess. Focal free fluid adjacent to the tip may be reactive, microperforation not excluded. Transplant Surgery     Patient seen and examined with  Dr. Lopez, and NP Chhaya León. Disciplines not in attendance will be notified of the plan.     24yo woman with no PMH presents for abdominal pain found to have acute appendicitis on ED CT. Pain started yesterday around umbilicus and is now focally tender in RLQ. Pain is described as sharp and constant. She has tried tylenol with some relief. She reports nausea and vomiting since yesterday evening. No fevers, chills, chest pain, SOB, cough. No sick contacts. Last ate today at 16:00.  In ED, WBC elevated at 16.5, afebrile, HD stable. CT showed Acute appendicitis without free air or organized abscess. Focal free fluid adjacent to the tip may be reactive, microperforation not excluded.    Interval Events:  - Vomited yesterday after lunch.  NPO/IVF since.  Denies n/v at present  - States abdominal pain has improved.  Reports some TTP to deep palpation midline lower abdomen  - Afebrile, TMax 37.0   WBC 5.46 from 9.25    On Zosyn    Education:  Medications    Plan of care:  See Below      MEDICATIONS  (STANDING):  dextrose 5% + sodium chloride 0.45% 1000 milliLiter(s) (100 mL/Hr) IV Continuous <Continuous>  enoxaparin Injectable 40 milliGRAM(s) SubCutaneous daily  piperacillin/tazobactam IVPB.. 3.375 Gram(s) IV Intermittent every 8 hours    MEDICATIONS  (PRN):  ondansetron Injectable 4 milliGRAM(s) IV Push every 6 hours PRN Nausea  oxycodone    5 mG/acetaminophen 325 mG 1 Tablet(s) Oral every 4 hours PRN Moderate Pain (4 - 6)  oxycodone    5 mG/acetaminophen 325 mG 2 Tablet(s) Oral every 6 hours PRN Severe Pain (7 - 10)      PAST MEDICAL & SURGICAL HISTORY:  No pertinent past medical history  No significant past surgical history      Vital Signs Last 24 Hrs  T(C): 36.7 (16 Apr 2020 08:11), Max: 37 (15 Apr 2020 16:16)  T(F): 98 (16 Apr 2020 08:11), Max: 98.6 (15 Apr 2020 16:16)  HR: 78 (16 Apr 2020 08:11) (77 - 82)  BP: 104/72 (16 Apr 2020 08:11) (99/61 - 115/78)  BP(mean): --  RR: 18 (16 Apr 2020 08:11) (18 - 18)  SpO2: 98% (16 Apr 2020 08:11) (96% - 98%)    I&O's Summary    15 Apr 2020 07:01  -  16 Apr 2020 07:00  --------------------------------------------------------  IN: 600 mL / OUT: 0 mL / NET: 600 mL                              9.5    5.46  )-----------( 288      ( 16 Apr 2020 06:13 )             31.5     04-16    138  |  103  |  15  ----------------------------<  58<L>  3.5   |  22  |  0.77    Ca    9.3      16 Apr 2020 06:11  Phos  3.4     04-16  Mg     1.9     04-16    TPro  8.1  /  Alb  4.2  /  TBili  0.4  /  DBili  x   /  AST  28  /  ALT  16  /  AlkPhos  58  04-14        Review of systems  Gen: No weight changes, fatigue, fevers/chills, weakness  Skin: No rashes  Head/Eyes/Ears/Mouth: No headache; Normal hearing; Normal vision w/o blurriness; No sinus pain/discomfort, sore throat  Respiratory: No dyspnea, cough, wheezing, hemoptysis  CV: No chest pain, PND, orthopnea  GI: Mild abdominal pain/TTP midline lower abdomen. Denies diarrhea, constipation, nausea, vomiting, melena, hematochezia  : No increased frequency, dysuria, hematuria, nocturia  MSK: No joint pain/swelling; no back pain; no edema  Neuro: No dizziness/lightheadedness, weakness, seizures, numbness, tingling  Heme: No easy bruising or bleeding  Endo: No heat/cold intolerance  Psych: No significant nervousness, anxiety, stress, depression  All other systems were reviewed and are negative, except as noted.      PHYSICAL EXAM:  General: NAD  Respiratory: respirations unlabored, clear  CVS: regular rate and rhythm  Abdomen: soft, nondistended, +mild midline/RLQ TTP, no rebound, no guarding  Extremities: no edema   Skin: warm, dry, intact

## 2020-04-16 NOTE — DISCHARGE NOTE NURSING/CASE MANAGEMENT/SOCIAL WORK - NSDCFUADDAPPT_GEN_ALL_CORE_FT
1: Please call the Surgery Office to schedule a telehealth appointment with Dr. Lopez for early next week. Phone: [phone]

## 2020-04-21 ENCOUNTER — APPOINTMENT (OUTPATIENT)
Dept: TRANSPLANT | Facility: CLINIC | Age: 25
End: 2020-04-21
Payer: COMMERCIAL

## 2020-04-21 ENCOUNTER — TRANSCRIPTION ENCOUNTER (OUTPATIENT)
Age: 25
End: 2020-04-21

## 2020-04-21 PROBLEM — Z00.00 ENCOUNTER FOR PREVENTIVE HEALTH EXAMINATION: Status: ACTIVE | Noted: 2020-04-21

## 2020-04-21 PROCEDURE — 99214 OFFICE O/P EST MOD 30 MIN: CPT | Mod: 95

## 2020-04-21 NOTE — HISTORY OF PRESENT ILLNESS
[Home] : at home, [unfilled] , at the time of the visit. [Patient] : the patient [Other Location: e.g. Home (Enter Location, City,State)___] : at [unfilled] [de-identified] : Admitted for acute appendicitis.  Managed with abx and d/c'd on 4/16, tolerating diet with improved pain. \par \par Since discharge has felt well, with no significant pain.  Continues to tolerate a normal diet.  No analgesic requirements.  Remains on PO abx. Feels "much much better."  Having diarrhea. \par \par

## 2020-04-21 NOTE — PHYSICAL EXAM
[Alert] : alert [de-identified] : appears well [Calm] : calm [de-identified] : breathing comfortably on RA

## 2020-04-21 NOTE — ASSESSMENT
[FreeTextEntry1] : 26yo F with acute appendicitis managed nonoperatively.\par \par - Augmentin likely contributing to diarrhea.  Rec'd probiotics.\par - Will schedule lap appendectomy electively

## 2020-07-22 ENCOUNTER — APPOINTMENT (OUTPATIENT)
Dept: TRANSPLANT | Facility: CLINIC | Age: 25
End: 2020-07-22
Payer: COMMERCIAL

## 2020-07-22 PROCEDURE — 99214 OFFICE O/P EST MOD 30 MIN: CPT | Mod: 95

## 2020-07-22 NOTE — PHYSICAL EXAM
[Alert] : alert [Oriented to Person] : oriented to person [Calm] : calm [de-identified] : appears well [de-identified] : breathing comfortably on RA

## 2020-07-22 NOTE — HISTORY OF PRESENT ILLNESS
[de-identified] : Admitted for acute appendicitis.  Managed with abx and d/c'd on 4/16.\par \par Has since felt well.  Would like to have an interval appendectomy, she is worried about developing a recurrent appendicitis if she is pregnant.  \par \par  [Home] : at home, [unfilled] , at the time of the visit. [Medical Office: ([facility])___] : at the medical office located in  [Patient] : the patient [Self] : self

## 2020-07-22 NOTE — ASSESSMENT
[FreeTextEntry1] : 26yo F with acute appendicitis managed nonoperatively.\par \par - we discussed the risks/benefits and alternatives of laparoscopic appendectomy.   She was advised of the risks (bleeding, infection, cecal leak, damage to adjacent structures).   She was advised that she could continue non-operative therapy, though she likely has a slightly higher risk of recurrence.  She would like to proceed with operative therapy.  All  questions were answered.  Will get a CT AP first to ensure radiographic resolution then proceed with scheduling.

## 2020-08-01 ENCOUNTER — OUTPATIENT (OUTPATIENT)
Dept: OUTPATIENT SERVICES | Facility: HOSPITAL | Age: 25
LOS: 1 days | End: 2020-08-01
Payer: COMMERCIAL

## 2020-08-01 ENCOUNTER — APPOINTMENT (OUTPATIENT)
Dept: CT IMAGING | Facility: IMAGING CENTER | Age: 25
End: 2020-08-01

## 2020-08-01 DIAGNOSIS — K35.32 ACUTE APPENDICITIS WITH PERFORATION, LOCALIZED PERITONITIS, AND GANGRENE, WITHOUT ABSCESS: ICD-10-CM

## 2020-08-01 PROCEDURE — 74176 CT ABD & PELVIS W/O CONTRAST: CPT

## 2020-08-01 PROCEDURE — 74176 CT ABD & PELVIS W/O CONTRAST: CPT | Mod: 26

## 2020-09-04 ENCOUNTER — OUTPATIENT (OUTPATIENT)
Dept: OUTPATIENT SERVICES | Facility: HOSPITAL | Age: 25
LOS: 1 days | End: 2020-09-04
Payer: COMMERCIAL

## 2020-09-04 VITALS
SYSTOLIC BLOOD PRESSURE: 98 MMHG | WEIGHT: 145.95 LBS | DIASTOLIC BLOOD PRESSURE: 63 MMHG | TEMPERATURE: 98 F | HEART RATE: 81 BPM | HEIGHT: 62.01 IN | OXYGEN SATURATION: 99 % | RESPIRATION RATE: 16 BRPM

## 2020-09-04 DIAGNOSIS — Z01.818 ENCOUNTER FOR OTHER PREPROCEDURAL EXAMINATION: ICD-10-CM

## 2020-09-04 DIAGNOSIS — K35.31 ACUTE APPENDICITIS WITH LOCALIZED PERITONITIS AND GANGRENE, WITHOUT PERFORATION: ICD-10-CM

## 2020-09-04 DIAGNOSIS — K08.409 PARTIAL LOSS OF TEETH, UNSPECIFIED CAUSE, UNSPECIFIED CLASS: Chronic | ICD-10-CM

## 2020-09-04 DIAGNOSIS — K35.80 UNSPECIFIED ACUTE APPENDICITIS: ICD-10-CM

## 2020-09-04 LAB
ANION GAP SERPL CALC-SCNC: 14 MMOL/L — SIGNIFICANT CHANGE UP (ref 5–17)
BUN SERPL-MCNC: 10 MG/DL — SIGNIFICANT CHANGE UP (ref 7–23)
CALCIUM SERPL-MCNC: 9.5 MG/DL — SIGNIFICANT CHANGE UP (ref 8.4–10.5)
CHLORIDE SERPL-SCNC: 100 MMOL/L — SIGNIFICANT CHANGE UP (ref 96–108)
CO2 SERPL-SCNC: 23 MMOL/L — SIGNIFICANT CHANGE UP (ref 22–31)
CREAT SERPL-MCNC: 0.61 MG/DL — SIGNIFICANT CHANGE UP (ref 0.5–1.3)
GLUCOSE SERPL-MCNC: 104 MG/DL — HIGH (ref 70–99)
HCT VFR BLD CALC: 35.4 % — SIGNIFICANT CHANGE UP (ref 34.5–45)
HGB BLD-MCNC: 10.9 G/DL — LOW (ref 11.5–15.5)
MCHC RBC-ENTMCNC: 22.9 PG — LOW (ref 27–34)
MCHC RBC-ENTMCNC: 30.8 GM/DL — LOW (ref 32–36)
MCV RBC AUTO: 74.2 FL — LOW (ref 80–100)
NRBC # BLD: 0 /100 WBCS — SIGNIFICANT CHANGE UP (ref 0–0)
PLATELET # BLD AUTO: 384 K/UL — SIGNIFICANT CHANGE UP (ref 150–400)
POTASSIUM SERPL-MCNC: 3.8 MMOL/L — SIGNIFICANT CHANGE UP (ref 3.5–5.3)
POTASSIUM SERPL-SCNC: 3.8 MMOL/L — SIGNIFICANT CHANGE UP (ref 3.5–5.3)
RBC # BLD: 4.77 M/UL — SIGNIFICANT CHANGE UP (ref 3.8–5.2)
RBC # FLD: 16.3 % — HIGH (ref 10.3–14.5)
SODIUM SERPL-SCNC: 137 MMOL/L — SIGNIFICANT CHANGE UP (ref 135–145)
WBC # BLD: 6.37 K/UL — SIGNIFICANT CHANGE UP (ref 3.8–10.5)
WBC # FLD AUTO: 6.37 K/UL — SIGNIFICANT CHANGE UP (ref 3.8–10.5)

## 2020-09-04 PROCEDURE — 85027 COMPLETE CBC AUTOMATED: CPT

## 2020-09-04 PROCEDURE — G0463: CPT

## 2020-09-04 PROCEDURE — 80048 BASIC METABOLIC PNL TOTAL CA: CPT

## 2020-09-04 RX ORDER — SODIUM CHLORIDE 9 MG/ML
3 INJECTION INTRAMUSCULAR; INTRAVENOUS; SUBCUTANEOUS EVERY 8 HOURS
Refills: 0 | Status: DISCONTINUED | OUTPATIENT
Start: 2020-09-10 | End: 2020-09-24

## 2020-09-04 RX ORDER — LIDOCAINE HCL 20 MG/ML
0.2 VIAL (ML) INJECTION ONCE
Refills: 0 | Status: DISCONTINUED | OUTPATIENT
Start: 2020-09-10 | End: 2020-09-24

## 2020-09-04 RX ORDER — CEFOTETAN DISODIUM 1 G
2 VIAL (EA) INJECTION ONCE
Refills: 0 | Status: DISCONTINUED | OUTPATIENT
Start: 2020-09-10 | End: 2020-09-24

## 2020-09-04 NOTE — H&P PST ADULT - NSICDXPROBLEM_GEN_ALL_CORE_FT
PROBLEM DIAGNOSES  Problem: Acute appendicitis  Assessment and Plan: Laparoscopic Appendectomy  Stat UCG on admission

## 2020-09-04 NOTE — H&P PST ADULT - ATTENDING COMMENTS
26yo F was admitted during the COVID crisis with acute appendicitis.   She was managed non-operatively.  Now she presents for elective laparoscopic interval appendectomy.  Risks/benefits/alternatives to surgery discussed.  Outlined risks include bleeding, infection and damage to adjacent structures.  After all questions were answered, she signed surgical consent.

## 2020-09-04 NOTE — H&P PST ADULT - HISTORY OF PRESENT ILLNESS
24 yo female with no significant past medical history s/p episode of acute appendicitis 4/2020. Pt was hospitalized, managed with antibiotics and discharged on 4/16/20. Pt denies current abdominal pain or fever. She is scheduled for Laparoscopic Appendectomy on 9/10/2020.    Pt is scheduled for Covid-19 PCR on 9/8/2020 at the [address] [place] [address] site.

## 2020-09-06 DIAGNOSIS — Z01.818 ENCOUNTER FOR OTHER PREPROCEDURAL EXAMINATION: ICD-10-CM

## 2020-09-08 ENCOUNTER — APPOINTMENT (OUTPATIENT)
Dept: DISASTER EMERGENCY | Facility: CLINIC | Age: 25
End: 2020-09-08

## 2020-09-08 LAB — SARS-COV-2 N GENE NPH QL NAA+PROBE: NOT DETECTED

## 2020-09-09 ENCOUNTER — TRANSCRIPTION ENCOUNTER (OUTPATIENT)
Age: 25
End: 2020-09-09

## 2020-09-10 ENCOUNTER — RESULT REVIEW (OUTPATIENT)
Age: 25
End: 2020-09-10

## 2020-09-10 ENCOUNTER — APPOINTMENT (OUTPATIENT)
Dept: TRANSPLANT | Facility: HOSPITAL | Age: 25
End: 2020-09-10

## 2020-09-10 ENCOUNTER — OUTPATIENT (OUTPATIENT)
Dept: OUTPATIENT SERVICES | Facility: HOSPITAL | Age: 25
LOS: 1 days | End: 2020-09-10
Payer: COMMERCIAL

## 2020-09-10 VITALS
RESPIRATION RATE: 16 BRPM | HEART RATE: 71 BPM | TEMPERATURE: 98 F | OXYGEN SATURATION: 99 % | WEIGHT: 145.95 LBS | DIASTOLIC BLOOD PRESSURE: 65 MMHG | SYSTOLIC BLOOD PRESSURE: 96 MMHG | HEIGHT: 62.01 IN

## 2020-09-10 VITALS — HEART RATE: 78 BPM

## 2020-09-10 DIAGNOSIS — K35.30 ACUTE APPENDICITIS WITH LOCALIZED PERITONITIS, WITHOUT PERFORATION OR GANGRENE: ICD-10-CM

## 2020-09-10 DIAGNOSIS — K35.31 ACUTE APPENDICITIS WITH LOCALIZED PERITONITIS AND GANGRENE, WITHOUT PERFORATION: ICD-10-CM

## 2020-09-10 DIAGNOSIS — K08.409 PARTIAL LOSS OF TEETH, UNSPECIFIED CAUSE, UNSPECIFIED CLASS: Chronic | ICD-10-CM

## 2020-09-10 DIAGNOSIS — Z01.818 ENCOUNTER FOR OTHER PREPROCEDURAL EXAMINATION: ICD-10-CM

## 2020-09-10 LAB — HCG UR QL: NEGATIVE — SIGNIFICANT CHANGE UP

## 2020-09-10 PROCEDURE — 44970 LAPAROSCOPY APPENDECTOMY: CPT | Mod: GC

## 2020-09-10 PROCEDURE — 44970 LAPAROSCOPY APPENDECTOMY: CPT

## 2020-09-10 PROCEDURE — 81025 URINE PREGNANCY TEST: CPT

## 2020-09-10 PROCEDURE — 88304 TISSUE EXAM BY PATHOLOGIST: CPT | Mod: 26

## 2020-09-10 PROCEDURE — 88304 TISSUE EXAM BY PATHOLOGIST: CPT

## 2020-09-10 PROCEDURE — C1889: CPT

## 2020-09-10 PROCEDURE — C9399: CPT

## 2020-09-10 RX ORDER — OXYCODONE HYDROCHLORIDE 5 MG/1
1 TABLET ORAL
Qty: 10 | Refills: 0
Start: 2020-09-10 | End: 2020-09-12

## 2020-09-10 RX ORDER — HALOPERIDOL DECANOATE 100 MG/ML
1 INJECTION INTRAMUSCULAR ONCE
Refills: 0 | Status: DISCONTINUED | OUTPATIENT
Start: 2020-09-10 | End: 2020-09-24

## 2020-09-10 RX ORDER — TRAMADOL HYDROCHLORIDE 50 MG/1
1 TABLET ORAL
Qty: 10 | Refills: 0
Start: 2020-09-10 | End: 2020-09-12

## 2020-09-10 RX ORDER — HYDROMORPHONE HYDROCHLORIDE 2 MG/ML
0.5 INJECTION INTRAMUSCULAR; INTRAVENOUS; SUBCUTANEOUS
Refills: 0 | Status: DISCONTINUED | OUTPATIENT
Start: 2020-09-10 | End: 2020-09-10

## 2020-09-10 RX ORDER — KETOROLAC TROMETHAMINE 30 MG/ML
30 SYRINGE (ML) INJECTION ONCE
Refills: 0 | Status: DISCONTINUED | OUTPATIENT
Start: 2020-09-10 | End: 2020-09-10

## 2020-09-10 RX ORDER — SODIUM CHLORIDE 9 MG/ML
1000 INJECTION, SOLUTION INTRAVENOUS
Refills: 0 | Status: DISCONTINUED | OUTPATIENT
Start: 2020-09-10 | End: 2020-09-24

## 2020-09-10 RX ADMIN — HYDROMORPHONE HYDROCHLORIDE 0.5 MILLIGRAM(S): 2 INJECTION INTRAMUSCULAR; INTRAVENOUS; SUBCUTANEOUS at 17:40

## 2020-09-10 RX ADMIN — HYDROMORPHONE HYDROCHLORIDE 0.5 MILLIGRAM(S): 2 INJECTION INTRAMUSCULAR; INTRAVENOUS; SUBCUTANEOUS at 17:10

## 2020-09-10 RX ADMIN — HYDROMORPHONE HYDROCHLORIDE 0.5 MILLIGRAM(S): 2 INJECTION INTRAMUSCULAR; INTRAVENOUS; SUBCUTANEOUS at 17:54

## 2020-09-10 RX ADMIN — SODIUM CHLORIDE 75 MILLILITER(S): 9 INJECTION, SOLUTION INTRAVENOUS at 17:09

## 2020-09-10 RX ADMIN — HYDROMORPHONE HYDROCHLORIDE 0.5 MILLIGRAM(S): 2 INJECTION INTRAMUSCULAR; INTRAVENOUS; SUBCUTANEOUS at 18:06

## 2020-09-10 RX ADMIN — HYDROMORPHONE HYDROCHLORIDE 0.5 MILLIGRAM(S): 2 INJECTION INTRAMUSCULAR; INTRAVENOUS; SUBCUTANEOUS at 17:24

## 2020-09-10 NOTE — ASU DISCHARGE PLAN (ADULT/PEDIATRIC) - MEDICATION INSTRUCTIONS
Take Tylenol up to 975mg and Ibuprofen up to 800 mg every 6 hours, alternating so that you take one medication, then the other every 3 hours

## 2020-09-10 NOTE — ASU DISCHARGE PLAN (ADULT/PEDIATRIC) - CALL YOUR DOCTOR IF YOU HAVE ANY OF THE FOLLOWING:
Excessive diarrhea/Bleeding that does not stop/Inability to tolerate liquids or foods/Fever greater than (need to indicate Fahrenheit or Celsius)/Nausea and vomiting that does not stop/Pain not relieved by Medications/Wound/Surgical Site with redness, or foul smelling discharge or pus/Numbness, tingling, color or temperature change to extremity/Unable to urinate

## 2020-09-10 NOTE — ASU DISCHARGE PLAN (ADULT/PEDIATRIC) - CARE PROVIDER_API CALL
[facility] Lopez I  SURGERY  [address] [place], [place] [address]  Phone: [phone]  Fax: [phone]  Follow Up Time: 2 weeks

## 2020-09-10 NOTE — ASU DISCHARGE PLAN (ADULT/PEDIATRIC) - PAIN MANAGEMENT
Take over the counter pain medication/Prescriptions electronically submitted to pharmacy from [facility]: [id number] Expected Date Of Service: 05/29/2019 Bill For Surgical Tray: no Billing Type: Third-Party Bill

## 2020-09-16 PROBLEM — K35.80 UNSPECIFIED ACUTE APPENDICITIS: Chronic | Status: ACTIVE | Noted: 2020-09-04

## 2020-09-21 ENCOUNTER — APPOINTMENT (OUTPATIENT)
Dept: TRANSPLANT | Facility: CLINIC | Age: 25
End: 2020-09-21
Payer: COMMERCIAL

## 2020-09-21 PROCEDURE — 99024 POSTOP FOLLOW-UP VISIT: CPT

## 2020-09-21 NOTE — PLAN
[FreeTextEntry1] : 24yo F sp lap appendectomy. \par \par Path reviewed. \par \par Small area of skin dehiscence.  Discussed wound care recs (daily hydrogen peroxide and cover with dry gauze).  She will monitor incision closely and be in touch.  Discussed signs and symptoms of wound infection. \par \par F/U as needed.

## 2020-09-21 NOTE — HISTORY OF PRESENT ILLNESS
[de-identified] : 24yo F sp lap appendectomy.  Doing well.  Tolerating diet.  Nl BMs.  No analgesic requirements.  Has noticed clear discharge from below incision, non-purulent.  No fevers/chills.

## 2020-09-21 NOTE — PHYSICAL EXAM
[de-identified] : NAD [de-identified] : small focus of dehisced skin at inferior border of infraumbilical incision.   No surrounding erythema.  Scant serous discharge.   All other lap incisions CDI

## 2020-09-22 ENCOUNTER — APPOINTMENT (OUTPATIENT)
Dept: TRANSPLANT | Facility: CLINIC | Age: 25
End: 2020-09-22

## 2020-11-03 ENCOUNTER — APPOINTMENT (OUTPATIENT)
Dept: TRANSPLANT | Facility: CLINIC | Age: 25
End: 2020-11-03
Payer: COMMERCIAL

## 2020-11-03 VITALS
HEIGHT: 62 IN | HEART RATE: 83 BPM | DIASTOLIC BLOOD PRESSURE: 84 MMHG | BODY MASS INDEX: 25.4 KG/M2 | SYSTOLIC BLOOD PRESSURE: 120 MMHG | TEMPERATURE: 97.3 F | WEIGHT: 138 LBS | RESPIRATION RATE: 14 BRPM | OXYGEN SATURATION: 98 %

## 2020-11-03 DIAGNOSIS — Z90.49 ACQUIRED ABSENCE OF OTHER SPECIFIED PARTS OF DIGESTIVE TRACT: ICD-10-CM

## 2020-11-03 DIAGNOSIS — T81.89XA OTHER COMPLICATIONS OF PROCEDURES, NOT ELSEWHERE CLASSIFIED, INITIAL ENCOUNTER: ICD-10-CM

## 2020-11-03 PROCEDURE — 99072 ADDL SUPL MATRL&STAF TM PHE: CPT

## 2020-11-03 PROCEDURE — 99213 OFFICE O/P EST LOW 20 MIN: CPT | Mod: 24

## 2020-11-03 NOTE — PLAN
[FreeTextEntry1] : 26yo F sp lap appendectomy. \par \par Has some leaking from belly button.  Non-malodorous.  No surrounding erythema.  No fevers/chills.  Has itching around all incisions.

## 2020-11-03 NOTE — ASSESSMENT
[FreeTextEntry1] : 24yo F sp lap appendectomy.  Now greater than 1mos out with infraumbilical wound discharge.  suspect suture granuloma. \par \par Discussed wound care with peroxide flush BID.  Will give 10day course of Keflex.  F/u 2wks.

## 2020-11-03 NOTE — PHYSICAL EXAM
[de-identified] : NAD [de-identified] : abd soft, nt/nd.  Infraumbilical incision with small inferior pole opening.  scant white discharge.

## 2020-11-03 NOTE — HISTORY OF PRESENT ILLNESS
[de-identified] : 26yo F sp lap appendectomy.  Doing well.  Tolerating diet.  Nl BMs.  No analgesic requirements.  Has noticed clear discharge from below incision, non-purulent.  No fevers/chills.

## 2020-12-20 ENCOUNTER — EMERGENCY (EMERGENCY)
Facility: HOSPITAL | Age: 25
LOS: 1 days | Discharge: HOME | End: 2020-12-20
Attending: EMERGENCY MEDICINE
Payer: COMMERCIAL

## 2020-12-20 VITALS
RESPIRATION RATE: 18 BRPM | HEART RATE: 79 BPM | SYSTOLIC BLOOD PRESSURE: 110 MMHG | TEMPERATURE: 98 F | WEIGHT: 134.04 LBS | OXYGEN SATURATION: 97 % | DIASTOLIC BLOOD PRESSURE: 73 MMHG | HEIGHT: 62 IN

## 2020-12-20 DIAGNOSIS — Z90.49 ACQUIRED ABSENCE OF OTHER SPECIFIED PARTS OF DIGESTIVE TRACT: Chronic | ICD-10-CM

## 2020-12-20 DIAGNOSIS — K08.409 PARTIAL LOSS OF TEETH, UNSPECIFIED CAUSE, UNSPECIFIED CLASS: Chronic | ICD-10-CM

## 2020-12-20 PROCEDURE — 99283 EMERGENCY DEPT VISIT LOW MDM: CPT

## 2020-12-20 RX ORDER — OXYCODONE HYDROCHLORIDE 5 MG/1
1 TABLET ORAL
Qty: 5 | Refills: 0
Start: 2020-12-20

## 2020-12-20 RX ORDER — ACETAMINOPHEN 500 MG
975 TABLET ORAL ONCE
Refills: 0 | Status: COMPLETED | OUTPATIENT
Start: 2020-12-20 | End: 2020-12-20

## 2020-12-20 RX ORDER — OFLOXACIN 0.3 %
2 DROPS OPHTHALMIC (EYE) ONCE
Refills: 0 | Status: COMPLETED | OUTPATIENT
Start: 2020-12-20 | End: 2020-12-20

## 2020-12-20 RX ORDER — IBUPROFEN 200 MG
600 TABLET ORAL ONCE
Refills: 0 | Status: COMPLETED | OUTPATIENT
Start: 2020-12-20 | End: 2020-12-20

## 2020-12-20 RX ADMIN — Medication 600 MILLIGRAM(S): at 22:36

## 2020-12-20 RX ADMIN — Medication 2 DROP(S): at 21:30

## 2020-12-20 RX ADMIN — Medication 975 MILLIGRAM(S): at 22:36

## 2020-12-20 NOTE — ED PROVIDER NOTE - NSFOLLOWUPCLINICS_GEN_ALL_ED_FT
[facility]  Ophthalmology  [address] [place] [address] [id number]  [place] [address]  Phone: [phone]  Fax:   Follow Up Time: 1-3 Days

## 2020-12-20 NOTE — ED ADULT TRIAGE NOTE - HEART RATE (BEATS/MIN)
Fall at 1600 this afternoon. Was going up the stairs when she tripped over some cans and fell backwards down approximately 5 stairs. Head, neck, and back pain. C-collar applied at triage. Questionable LOC.   79

## 2020-12-20 NOTE — ED PROVIDER NOTE - PATIENT PORTAL LINK FT
You can access the FollowMyHealth Patient Portal offered by [facility] by registering at the following website: [email or web address]. By joining [facility]’s FollowMyHealth portal, you will also be able to view your health information using other applications (apps) compatible with our system.

## 2020-12-20 NOTE — ED PROVIDER NOTE - CLINICAL SUMMARY MEDICAL DECISION MAKING FREE TEXT BOX
Young female with corneal abrasion from contact lens. Plan: antibiotics, analgesics, and f/u with opthalmology tomorrow.

## 2020-12-20 NOTE — ED PROVIDER NOTE - ATTENDING CONTRIBUTION TO CARE
History and Physical performed by me with scribe under my direct supervision.  NOEL Briones MD FACEP

## 2020-12-20 NOTE — ED PROVIDER NOTE - OBJECTIVE STATEMENT
24 y/o F with no significant pmhx p/w R eye pain and headache. Pt reports she used old contacts yesterday. Developed blurry vision soon after taking them out. Went to eye doctor today, was prescribed refresh relieva eye drops and antibiotics. Pt has been using the eye drops but reports R eye pain and headache. Denies fever, chills, vomiting. No trauma to eye. 24 y/o F with no significant pmhx p/w R eye pain x 2 days. Pt reports she used old colored contacts yesterday. Developed blurry vision soon after taking them out. Went to eye doctor today, was told she "scratched surface," and was prescribed refresh relieva eye drops and antibiotics. Pt has been using the eye drops, did not  antibiotics yet, but reports persistent R eye pain and associated headache. Denies fever, chills, vomiting. No other trauma to head or eye. Does not regularly wear glasses or contacts.

## 2020-12-20 NOTE — ED PROVIDER NOTE - PHYSICAL EXAMINATION
HENT: NCAT, neck supple  Eyes: OS mild injected conjunctive. No foreign body. Fluorescin diffuse punctate uptake from 9 o'clock to 2 o'clock including some covering optical axis and additional area roughly 6 o'clock. Anterior and posterior chambers normal. Visual acuity 20/50 in involved eye, 20/25 in uninvolved eye

## 2020-12-20 NOTE — ED ADULT NURSE NOTE - OBJECTIVE STATEMENT
25y F, A&Ox3, ambulatory, no significant PMH presents to the ED c/o R eye pain and headache. Pt reports that she used old contacts yesterday. Developed blurry vision soon after taking them out. Visited the eye doctor today, was prescribed refresh relieva eye drops and antibiotics. Pt has been using the eye drops but reports R eye pain and headache. Gross neuro intact, PEARRL, lungs cta bilaterally, no difficulty speaking in complete sentences, s1s2 heart sounds heard, pulses x 4, moving all extremities, abdomen soft nontender nondistended, skin intact. Pt denies fevers/chills, numbness/tingling, weakness, headache, dizziness, cp, sob, cough, abd pain, n/v/d, dysuria, hematuria, bloody stools, back pain.

## 2020-12-20 NOTE — ED PROVIDER NOTE - CONDITION AT DISCHARGE:
AMA (saw a physician/midlevel provider and clinician was able to provide reasons for staying for treatment & form is signed) Satisfactory

## 2020-12-20 NOTE — ED PROVIDER NOTE - NSFOLLOWUPINSTRUCTIONS_ED_ALL_ED_FT
Do not use contact lenses until directed by eye doctor.     Use antibiotic eye drops as directed:  Instill 1 to 2 drops in right eye every 2 to 4 hours for the first 2 days, then instill 1 to 2 drops 4 times daily for an additional 5 days.    May take Tylenol or Ibuprofren for pain as needed.   Take oxycodone 5mg every 8 hours as needed for severe pain *** caution SIDE EFFECT of drowsiness - DO NOT drive or drink alcohol while taking this medication.     Please follow up with your Opthalmologist upon discharge.  You may follow up with [facility], information provided.     Follow up with your Primary Care Provider upon discharge.     Please return to the Emergency Department immediately for any new, worsening, or concerning symptoms including worsening eye pain, new  visual changes, vomiting, passing out, or any other concerns.

## 2020-12-30 ENCOUNTER — APPOINTMENT (OUTPATIENT)
Dept: OPHTHALMOLOGY | Facility: CLINIC | Age: 25
End: 2020-12-30

## 2021-01-08 ENCOUNTER — APPOINTMENT (OUTPATIENT)
Dept: OPHTHALMOLOGY | Facility: CLINIC | Age: 26
End: 2021-01-08
Payer: COMMERCIAL

## 2021-01-08 ENCOUNTER — NON-APPOINTMENT (OUTPATIENT)
Age: 26
End: 2021-01-08

## 2021-01-08 PROCEDURE — 99072 ADDL SUPL MATRL&STAF TM PHE: CPT

## 2021-01-08 PROCEDURE — 92002 INTRM OPH EXAM NEW PATIENT: CPT

## 2021-06-02 ENCOUNTER — APPOINTMENT (OUTPATIENT)
Dept: ANTEPARTUM | Facility: CLINIC | Age: 26
End: 2021-06-02
Payer: MEDICAID

## 2021-06-02 ENCOUNTER — NON-APPOINTMENT (OUTPATIENT)
Age: 26
End: 2021-06-02

## 2021-06-02 ENCOUNTER — ASOB RESULT (OUTPATIENT)
Age: 26
End: 2021-06-02

## 2021-06-02 ENCOUNTER — APPOINTMENT (OUTPATIENT)
Dept: OBGYN | Facility: CLINIC | Age: 26
End: 2021-06-02
Payer: MEDICAID

## 2021-06-02 VITALS
HEART RATE: 89 BPM | WEIGHT: 133 LBS | TEMPERATURE: 97.2 F | HEIGHT: 62 IN | SYSTOLIC BLOOD PRESSURE: 111 MMHG | DIASTOLIC BLOOD PRESSURE: 70 MMHG | BODY MASS INDEX: 24.48 KG/M2

## 2021-06-02 VITALS
DIASTOLIC BLOOD PRESSURE: 70 MMHG | HEIGHT: 62 IN | WEIGHT: 133 LBS | BODY MASS INDEX: 24.48 KG/M2 | SYSTOLIC BLOOD PRESSURE: 111 MMHG | TEMPERATURE: 97.2 F

## 2021-06-02 PROCEDURE — 76805 OB US >/= 14 WKS SNGL FETUS: CPT

## 2021-06-02 PROCEDURE — 99212 OFFICE O/P EST SF 10 MIN: CPT

## 2021-06-02 PROCEDURE — 76817 TRANSVAGINAL US OBSTETRIC: CPT

## 2021-06-04 ENCOUNTER — TRANSCRIPTION ENCOUNTER (OUTPATIENT)
Age: 26
End: 2021-06-04

## 2021-06-08 ENCOUNTER — APPOINTMENT (OUTPATIENT)
Dept: ANTEPARTUM | Facility: CLINIC | Age: 26
End: 2021-06-08

## 2021-06-10 ENCOUNTER — APPOINTMENT (OUTPATIENT)
Dept: ANTEPARTUM | Facility: CLINIC | Age: 26
End: 2021-06-10
Payer: MEDICAID

## 2021-06-10 ENCOUNTER — ASOB RESULT (OUTPATIENT)
Age: 26
End: 2021-06-10

## 2021-06-10 PROCEDURE — 99442: CPT

## 2021-06-10 PROCEDURE — 76815 OB US LIMITED FETUS(S): CPT

## 2021-06-10 PROCEDURE — 76817 TRANSVAGINAL US OBSTETRIC: CPT

## 2021-06-11 ENCOUNTER — TRANSCRIPTION ENCOUNTER (OUTPATIENT)
Age: 26
End: 2021-06-11

## 2021-06-15 ENCOUNTER — NON-APPOINTMENT (OUTPATIENT)
Age: 26
End: 2021-06-15

## 2021-06-15 ENCOUNTER — APPOINTMENT (OUTPATIENT)
Dept: OBGYN | Facility: CLINIC | Age: 26
End: 2021-06-15
Payer: MEDICAID

## 2021-06-15 VITALS
WEIGHT: 135 LBS | TEMPERATURE: 97.2 F | HEIGHT: 62 IN | SYSTOLIC BLOOD PRESSURE: 102 MMHG | DIASTOLIC BLOOD PRESSURE: 62 MMHG | HEART RATE: 87 BPM | BODY MASS INDEX: 24.84 KG/M2

## 2021-06-15 PROCEDURE — 99212 OFFICE O/P EST SF 10 MIN: CPT

## 2021-06-20 ENCOUNTER — NON-APPOINTMENT (OUTPATIENT)
Age: 26
End: 2021-06-20

## 2021-07-03 LAB
BASOPHILS # BLD AUTO: 0.04 K/UL
BASOPHILS NFR BLD AUTO: 0.4 %
EOSINOPHIL # BLD AUTO: 0.31 K/UL
EOSINOPHIL NFR BLD AUTO: 3.4 %
GLUCOSE 1H P 50 G GLC PO SERPL-MCNC: 98 MG/DL
HCT VFR BLD CALC: 30.7 %
HGB BLD-MCNC: 9.9 G/DL
IMM GRANULOCYTES NFR BLD AUTO: 1.2 %
LYMPHOCYTES # BLD AUTO: 1.98 K/UL
LYMPHOCYTES NFR BLD AUTO: 21.6 %
MAN DIFF?: NORMAL
MCHC RBC-ENTMCNC: 26 PG
MCHC RBC-ENTMCNC: 32.2 GM/DL
MCV RBC AUTO: 80.6 FL
MONOCYTES # BLD AUTO: 0.7 K/UL
MONOCYTES NFR BLD AUTO: 7.6 %
NEUTROPHILS # BLD AUTO: 6.03 K/UL
NEUTROPHILS NFR BLD AUTO: 65.8 %
PLATELET # BLD AUTO: 304 K/UL
RBC # BLD: 3.81 M/UL
RBC # FLD: 14.3 %
WBC # FLD AUTO: 9.17 K/UL

## 2021-07-06 ENCOUNTER — NON-APPOINTMENT (OUTPATIENT)
Age: 26
End: 2021-07-06

## 2021-07-06 ENCOUNTER — APPOINTMENT (OUTPATIENT)
Dept: OBGYN | Facility: CLINIC | Age: 26
End: 2021-07-06
Payer: MEDICAID

## 2021-07-06 VITALS
BODY MASS INDEX: 25.03 KG/M2 | SYSTOLIC BLOOD PRESSURE: 101 MMHG | HEIGHT: 62 IN | HEART RATE: 96 BPM | DIASTOLIC BLOOD PRESSURE: 67 MMHG | WEIGHT: 136 LBS

## 2021-07-06 PROCEDURE — 99212 OFFICE O/P EST SF 10 MIN: CPT

## 2021-07-06 RX ORDER — CEPHALEXIN 500 MG/1
500 CAPSULE ORAL
Qty: 16 | Refills: 0 | Status: DISCONTINUED | COMMUNITY
Start: 2020-11-03 | End: 2021-07-06

## 2021-07-22 ENCOUNTER — NON-APPOINTMENT (OUTPATIENT)
Age: 26
End: 2021-07-22

## 2021-07-22 ENCOUNTER — APPOINTMENT (OUTPATIENT)
Dept: OBGYN | Facility: CLINIC | Age: 26
End: 2021-07-22
Payer: MEDICAID

## 2021-07-22 VITALS
WEIGHT: 137 LBS | HEIGHT: 62 IN | HEART RATE: 94 BPM | BODY MASS INDEX: 25.21 KG/M2 | DIASTOLIC BLOOD PRESSURE: 66 MMHG | SYSTOLIC BLOOD PRESSURE: 104 MMHG | TEMPERATURE: 96 F

## 2021-07-22 DIAGNOSIS — Z23 ENCOUNTER FOR IMMUNIZATION: ICD-10-CM

## 2021-07-22 PROCEDURE — 90471 IMMUNIZATION ADMIN: CPT

## 2021-07-22 PROCEDURE — 90715 TDAP VACCINE 7 YRS/> IM: CPT

## 2021-07-22 PROCEDURE — 99212 OFFICE O/P EST SF 10 MIN: CPT | Mod: 25

## 2021-08-02 ENCOUNTER — APPOINTMENT (OUTPATIENT)
Dept: OBGYN | Facility: CLINIC | Age: 26
End: 2021-08-02
Payer: MEDICAID

## 2021-08-02 VITALS
SYSTOLIC BLOOD PRESSURE: 103 MMHG | BODY MASS INDEX: 25.03 KG/M2 | WEIGHT: 136 LBS | DIASTOLIC BLOOD PRESSURE: 65 MMHG | HEIGHT: 62 IN

## 2021-08-02 PROCEDURE — 99213 OFFICE O/P EST LOW 20 MIN: CPT

## 2021-08-05 ENCOUNTER — NON-APPOINTMENT (OUTPATIENT)
Age: 26
End: 2021-08-05

## 2021-08-18 ENCOUNTER — ASOB RESULT (OUTPATIENT)
Age: 26
End: 2021-08-18

## 2021-08-18 ENCOUNTER — APPOINTMENT (OUTPATIENT)
Dept: OBGYN | Facility: CLINIC | Age: 26
End: 2021-08-18

## 2021-08-18 ENCOUNTER — APPOINTMENT (OUTPATIENT)
Dept: ANTEPARTUM | Facility: CLINIC | Age: 26
End: 2021-08-18
Payer: MEDICAID

## 2021-08-18 VITALS
HEIGHT: 62 IN | BODY MASS INDEX: 25.51 KG/M2 | SYSTOLIC BLOOD PRESSURE: 115 MMHG | WEIGHT: 138.6 LBS | HEART RATE: 97 BPM | DIASTOLIC BLOOD PRESSURE: 73 MMHG

## 2021-08-18 PROCEDURE — 76819 FETAL BIOPHYS PROFIL W/O NST: CPT

## 2021-08-18 PROCEDURE — 76816 OB US FOLLOW-UP PER FETUS: CPT

## 2021-09-01 ENCOUNTER — APPOINTMENT (OUTPATIENT)
Dept: OBGYN | Facility: CLINIC | Age: 26
End: 2021-09-01
Payer: MEDICAID

## 2021-09-01 VITALS
SYSTOLIC BLOOD PRESSURE: 96 MMHG | BODY MASS INDEX: 25.76 KG/M2 | WEIGHT: 140 LBS | HEART RATE: 92 BPM | DIASTOLIC BLOOD PRESSURE: 60 MMHG | TEMPERATURE: 96 F | HEIGHT: 62 IN

## 2021-09-01 PROCEDURE — 99212 OFFICE O/P EST SF 10 MIN: CPT

## 2021-09-07 ENCOUNTER — NON-APPOINTMENT (OUTPATIENT)
Age: 26
End: 2021-09-07

## 2021-09-07 ENCOUNTER — APPOINTMENT (OUTPATIENT)
Dept: OBGYN | Facility: CLINIC | Age: 26
End: 2021-09-07
Payer: MEDICAID

## 2021-09-07 VITALS
SYSTOLIC BLOOD PRESSURE: 103 MMHG | HEIGHT: 62 IN | WEIGHT: 140 LBS | BODY MASS INDEX: 25.76 KG/M2 | DIASTOLIC BLOOD PRESSURE: 66 MMHG

## 2021-09-07 PROCEDURE — 99212 OFFICE O/P EST SF 10 MIN: CPT

## 2021-09-11 LAB
GP B STREP DNA SPEC QL NAA+PROBE: NORMAL
GP B STREP DNA SPEC QL NAA+PROBE: NOT DETECTED
SOURCE GBS: NORMAL

## 2021-09-16 ENCOUNTER — INPATIENT (INPATIENT)
Facility: HOSPITAL | Age: 26
LOS: 1 days | Discharge: HOME | End: 2021-09-18
Attending: OBSTETRICS & GYNECOLOGY | Admitting: OBSTETRICS & GYNECOLOGY
Payer: MEDICAID

## 2021-09-16 ENCOUNTER — TRANSCRIPTION ENCOUNTER (OUTPATIENT)
Age: 26
End: 2021-09-16

## 2021-09-16 ENCOUNTER — NON-APPOINTMENT (OUTPATIENT)
Age: 26
End: 2021-09-16

## 2021-09-16 VITALS
DIASTOLIC BLOOD PRESSURE: 53 MMHG | RESPIRATION RATE: 16 BRPM | SYSTOLIC BLOOD PRESSURE: 94 MMHG | HEART RATE: 63 BPM | TEMPERATURE: 98 F

## 2021-09-16 DIAGNOSIS — O26.899 OTHER SPECIFIED PREGNANCY RELATED CONDITIONS, UNSPECIFIED TRIMESTER: ICD-10-CM

## 2021-09-16 DIAGNOSIS — Z3A.00 WEEKS OF GESTATION OF PREGNANCY NOT SPECIFIED: ICD-10-CM

## 2021-09-16 DIAGNOSIS — Z90.49 ACQUIRED ABSENCE OF OTHER SPECIFIED PARTS OF DIGESTIVE TRACT: Chronic | ICD-10-CM

## 2021-09-16 DIAGNOSIS — K08.409 PARTIAL LOSS OF TEETH, UNSPECIFIED CAUSE, UNSPECIFIED CLASS: Chronic | ICD-10-CM

## 2021-09-16 LAB
BASOPHILS # BLD AUTO: 0.05 K/UL — SIGNIFICANT CHANGE UP (ref 0–0.2)
BASOPHILS NFR BLD AUTO: 0.5 % — SIGNIFICANT CHANGE UP (ref 0–2)
BLD GP AB SCN SERPL QL: NEGATIVE — SIGNIFICANT CHANGE UP
EOSINOPHIL # BLD AUTO: 0.33 K/UL — SIGNIFICANT CHANGE UP (ref 0–0.5)
EOSINOPHIL NFR BLD AUTO: 3.6 % — SIGNIFICANT CHANGE UP (ref 0–6)
HCT VFR BLD CALC: 32 % — LOW (ref 34.5–45)
HGB BLD-MCNC: 10 G/DL — LOW (ref 11.5–15.5)
IANC: 6 K/UL — SIGNIFICANT CHANGE UP (ref 1.5–8.5)
IMM GRANULOCYTES NFR BLD AUTO: 1.1 % — SIGNIFICANT CHANGE UP (ref 0–1.5)
LYMPHOCYTES # BLD AUTO: 1.87 K/UL — SIGNIFICANT CHANGE UP (ref 1–3.3)
LYMPHOCYTES # BLD AUTO: 20.4 % — SIGNIFICANT CHANGE UP (ref 13–44)
MCHC RBC-ENTMCNC: 23 PG — LOW (ref 27–34)
MCHC RBC-ENTMCNC: 31.3 GM/DL — LOW (ref 32–36)
MCV RBC AUTO: 73.6 FL — LOW (ref 80–100)
MONOCYTES # BLD AUTO: 0.8 K/UL — SIGNIFICANT CHANGE UP (ref 0–0.9)
MONOCYTES NFR BLD AUTO: 8.7 % — SIGNIFICANT CHANGE UP (ref 2–14)
NEUTROPHILS # BLD AUTO: 6 K/UL — SIGNIFICANT CHANGE UP (ref 1.8–7.4)
NEUTROPHILS NFR BLD AUTO: 65.7 % — SIGNIFICANT CHANGE UP (ref 43–77)
NRBC # BLD: 0 /100 WBCS — SIGNIFICANT CHANGE UP
NRBC # FLD: 0 K/UL — SIGNIFICANT CHANGE UP
PLATELET # BLD AUTO: 355 K/UL — SIGNIFICANT CHANGE UP (ref 150–400)
RBC # BLD: 4.35 M/UL — SIGNIFICANT CHANGE UP (ref 3.8–5.2)
RBC # FLD: 18.1 % — HIGH (ref 10.3–14.5)
RH IG SCN BLD-IMP: POSITIVE — SIGNIFICANT CHANGE UP
RH IG SCN BLD-IMP: POSITIVE — SIGNIFICANT CHANGE UP
SARS-COV-2 RNA SPEC QL NAA+PROBE: SIGNIFICANT CHANGE UP
WBC # BLD: 9.15 K/UL — SIGNIFICANT CHANGE UP (ref 3.8–10.5)
WBC # FLD AUTO: 9.15 K/UL — SIGNIFICANT CHANGE UP (ref 3.8–10.5)

## 2021-09-16 RX ORDER — OXYTOCIN 10 UNIT/ML
333.33 VIAL (ML) INJECTION
Qty: 20 | Refills: 0 | Status: DISCONTINUED | OUTPATIENT
Start: 2021-09-16 | End: 2021-09-17

## 2021-09-16 RX ORDER — SODIUM CHLORIDE 9 MG/ML
1000 INJECTION, SOLUTION INTRAVENOUS
Refills: 0 | Status: DISCONTINUED | OUTPATIENT
Start: 2021-09-16 | End: 2021-09-17

## 2021-09-16 RX ORDER — INFLUENZA VIRUS VACCINE 15; 15; 15; 15 UG/.5ML; UG/.5ML; UG/.5ML; UG/.5ML
0.5 SUSPENSION INTRAMUSCULAR ONCE
Refills: 0 | Status: DISCONTINUED | OUTPATIENT
Start: 2021-09-16 | End: 2021-09-18

## 2021-09-16 RX ADMIN — SODIUM CHLORIDE 125 MILLILITER(S): 9 INJECTION, SOLUTION INTRAVENOUS at 17:00

## 2021-09-16 RX ADMIN — SODIUM CHLORIDE 125 MILLILITER(S): 9 INJECTION, SOLUTION INTRAVENOUS at 19:13

## 2021-09-16 RX ADMIN — SODIUM CHLORIDE 125 MILLILITER(S): 9 INJECTION, SOLUTION INTRAVENOUS at 21:14

## 2021-09-16 NOTE — OB PROVIDER TRIAGE NOTE - HISTORY OF PRESENT ILLNESS
27 y/o  @ 38 wks gestation presents with c/o ROM @ 1330 mod amt clear odorless amniotic fluid with mild irregular cramping denies any VB reports +FM denies any n/v/d denies any fever or chills ap care comp by :   short cervix- was on progesterone til 36 wks gest

## 2021-09-16 NOTE — OB PROVIDER TRIAGE NOTE - NSHPPHYSICALEXAM_GEN_ALL_CORE
abdomen: soft, nt on palp  SSE: + pooling  SVE: 3.5/80 /-2   + nitrazine  TAS; vertex  EFW: 2948 grams  T(C): 36.6 (09-16-21 @ 14:57), Max: 36.6 (09-16-21 @ 14:57)  HR: 88 (09-16-21 @ 15:32) (63 - 88)  BP: 110/69 (09-16-21 @ 15:32) (94/53 - 110/69)  RR: 16 (09-16-21 @ 14:57) (16 - 16)  SpO2: --  cat 1 FHT  toco: irregular

## 2021-09-16 NOTE — CHART NOTE - NSCHARTNOTEFT_GEN_A_CORE
Ob Attg  Patient examined, VE 10/100/0  Cat 1 FHT, contractions irregular  Feeling some pressure with contractions, will start pushing when feeling   more pressure  Maria Teresa Ya MD

## 2021-09-16 NOTE — OB PROVIDER H&P - HISTORY OF PRESENT ILLNESS
25 y/o  @ 38 wks gestation presents with c/o ROM @ 1330 mod amt clear odorless amniotic fluid with mild irregular cramping denies any VB reports +FM denies any n/v/d denies any fever or chills ap care comp by :   short cervix- was on progesterone til 36 wks gest

## 2021-09-16 NOTE — OB PROVIDER H&P - ASSESSMENT
27 y/o  @ 38 wks gestation presents with PROM for expectant management   plan of care d/w dr hidalgo / dr vyas  admit to l&D  PROM @ 38 wks gestation for possible augmentation of labor   see admission orders    ht: 5'2  wt: 140

## 2021-09-16 NOTE — OB PROVIDER TRIAGE NOTE - NSOBPROVIDERNOTE_OBGYN_ALL_OB_FT
25 y/o  @ 38 wks gestation presents with PROM for expectant management   plan of care d/w dr hidalgo  admit to l&D  PROM @ 38 wks gestation for possible augmentation of labor   see admission orders    ht: 5'2  wt: 140

## 2021-09-17 ENCOUNTER — APPOINTMENT (OUTPATIENT)
Dept: OBGYN | Facility: CLINIC | Age: 26
End: 2021-09-17

## 2021-09-17 LAB
COVID-19 SPIKE DOMAIN AB INTERP: NEGATIVE — SIGNIFICANT CHANGE UP
COVID-19 SPIKE DOMAIN ANTIBODY RESULT: 0.4 U/ML — SIGNIFICANT CHANGE UP
HBV SURFACE AG SERPL QL IA: SIGNIFICANT CHANGE UP
SARS-COV-2 IGG+IGM SERPL QL IA: 0.4 U/ML — SIGNIFICANT CHANGE UP
SARS-COV-2 IGG+IGM SERPL QL IA: NEGATIVE — SIGNIFICANT CHANGE UP
T PALLIDUM AB TITR SER: NEGATIVE — SIGNIFICANT CHANGE UP

## 2021-09-17 PROCEDURE — 59409 OBSTETRICAL CARE: CPT | Mod: U9,UB,GC

## 2021-09-17 RX ORDER — OXYCODONE HYDROCHLORIDE 5 MG/1
5 TABLET ORAL
Refills: 0 | Status: DISCONTINUED | OUTPATIENT
Start: 2021-09-17 | End: 2021-09-18

## 2021-09-17 RX ORDER — TETANUS TOXOID, REDUCED DIPHTHERIA TOXOID AND ACELLULAR PERTUSSIS VACCINE, ADSORBED 5; 2.5; 8; 8; 2.5 [IU]/.5ML; [IU]/.5ML; UG/.5ML; UG/.5ML; UG/.5ML
0.5 SUSPENSION INTRAMUSCULAR ONCE
Refills: 0 | Status: DISCONTINUED | OUTPATIENT
Start: 2021-09-17 | End: 2021-09-18

## 2021-09-17 RX ORDER — SIMETHICONE 80 MG/1
80 TABLET, CHEWABLE ORAL EVERY 4 HOURS
Refills: 0 | Status: DISCONTINUED | OUTPATIENT
Start: 2021-09-17 | End: 2021-09-18

## 2021-09-17 RX ORDER — KETOROLAC TROMETHAMINE 30 MG/ML
30 SYRINGE (ML) INJECTION ONCE
Refills: 0 | Status: DISCONTINUED | OUTPATIENT
Start: 2021-09-17 | End: 2021-09-17

## 2021-09-17 RX ORDER — ACETAMINOPHEN 500 MG
3 TABLET ORAL
Qty: 0 | Refills: 0 | DISCHARGE
Start: 2021-09-17

## 2021-09-17 RX ORDER — PRAMOXINE HYDROCHLORIDE 150 MG/15G
1 AEROSOL, FOAM RECTAL EVERY 4 HOURS
Refills: 0 | Status: DISCONTINUED | OUTPATIENT
Start: 2021-09-17 | End: 2021-09-18

## 2021-09-17 RX ORDER — DIBUCAINE 1 %
1 OINTMENT (GRAM) RECTAL EVERY 6 HOURS
Refills: 0 | Status: DISCONTINUED | OUTPATIENT
Start: 2021-09-17 | End: 2021-09-18

## 2021-09-17 RX ORDER — IBUPROFEN 200 MG
1 TABLET ORAL
Qty: 0 | Refills: 0 | DISCHARGE
Start: 2021-09-17

## 2021-09-17 RX ORDER — SODIUM CHLORIDE 9 MG/ML
3 INJECTION INTRAMUSCULAR; INTRAVENOUS; SUBCUTANEOUS EVERY 8 HOURS
Refills: 0 | Status: DISCONTINUED | OUTPATIENT
Start: 2021-09-17 | End: 2021-09-18

## 2021-09-17 RX ORDER — HYDROCORTISONE 1 %
1 OINTMENT (GRAM) TOPICAL EVERY 6 HOURS
Refills: 0 | Status: DISCONTINUED | OUTPATIENT
Start: 2021-09-17 | End: 2021-09-18

## 2021-09-17 RX ORDER — IBUPROFEN 200 MG
600 TABLET ORAL EVERY 6 HOURS
Refills: 0 | Status: DISCONTINUED | OUTPATIENT
Start: 2021-09-17 | End: 2021-09-18

## 2021-09-17 RX ORDER — MAGNESIUM HYDROXIDE 400 MG/1
30 TABLET, CHEWABLE ORAL
Refills: 0 | Status: DISCONTINUED | OUTPATIENT
Start: 2021-09-17 | End: 2021-09-18

## 2021-09-17 RX ORDER — OXYCODONE HYDROCHLORIDE 5 MG/1
5 TABLET ORAL ONCE
Refills: 0 | Status: DISCONTINUED | OUTPATIENT
Start: 2021-09-17 | End: 2021-09-18

## 2021-09-17 RX ORDER — OXYTOCIN 10 UNIT/ML
333.33 VIAL (ML) INJECTION
Qty: 20 | Refills: 0 | Status: DISCONTINUED | OUTPATIENT
Start: 2021-09-17 | End: 2021-09-17

## 2021-09-17 RX ORDER — AER TRAVELER 0.5 G/1
1 SOLUTION RECTAL; TOPICAL EVERY 4 HOURS
Refills: 0 | Status: DISCONTINUED | OUTPATIENT
Start: 2021-09-17 | End: 2021-09-18

## 2021-09-17 RX ORDER — LANOLIN
1 OINTMENT (GRAM) TOPICAL EVERY 6 HOURS
Refills: 0 | Status: DISCONTINUED | OUTPATIENT
Start: 2021-09-17 | End: 2021-09-18

## 2021-09-17 RX ORDER — BENZOCAINE 10 %
1 GEL (GRAM) MUCOUS MEMBRANE EVERY 6 HOURS
Refills: 0 | Status: DISCONTINUED | OUTPATIENT
Start: 2021-09-17 | End: 2021-09-18

## 2021-09-17 RX ORDER — DIPHENHYDRAMINE HCL 50 MG
25 CAPSULE ORAL EVERY 6 HOURS
Refills: 0 | Status: DISCONTINUED | OUTPATIENT
Start: 2021-09-17 | End: 2021-09-18

## 2021-09-17 RX ORDER — ACETAMINOPHEN 500 MG
975 TABLET ORAL
Refills: 0 | Status: DISCONTINUED | OUTPATIENT
Start: 2021-09-17 | End: 2021-09-18

## 2021-09-17 RX ORDER — IBUPROFEN 200 MG
600 TABLET ORAL EVERY 6 HOURS
Refills: 0 | Status: COMPLETED | OUTPATIENT
Start: 2021-09-17 | End: 2022-08-16

## 2021-09-17 RX ADMIN — Medication 975 MILLIGRAM(S): at 13:24

## 2021-09-17 RX ADMIN — Medication 975 MILLIGRAM(S): at 22:04

## 2021-09-17 RX ADMIN — Medication 975 MILLIGRAM(S): at 22:45

## 2021-09-17 RX ADMIN — Medication 600 MILLIGRAM(S): at 13:53

## 2021-09-17 RX ADMIN — SODIUM CHLORIDE 3 MILLILITER(S): 9 INJECTION INTRAMUSCULAR; INTRAVENOUS; SUBCUTANEOUS at 05:40

## 2021-09-17 RX ADMIN — Medication 975 MILLIGRAM(S): at 07:06

## 2021-09-17 RX ADMIN — Medication 975 MILLIGRAM(S): at 13:54

## 2021-09-17 RX ADMIN — Medication 600 MILLIGRAM(S): at 13:23

## 2021-09-17 RX ADMIN — Medication 30 MILLIGRAM(S): at 03:38

## 2021-09-17 RX ADMIN — Medication 1 TABLET(S): at 13:24

## 2021-09-17 RX ADMIN — Medication 975 MILLIGRAM(S): at 06:35

## 2021-09-17 RX ADMIN — SODIUM CHLORIDE 3 MILLILITER(S): 9 INJECTION INTRAMUSCULAR; INTRAVENOUS; SUBCUTANEOUS at 13:33

## 2021-09-17 NOTE — OB RN DELIVERY SUMMARY - NS_PROPHYLABX_OBGYN_ALL_OB
Physician Documentation                                                                           

 [facility]                                                                

Name: Kati Rosado                                                                               

Age: 22 yrs                                                                                       

Sex: Female                                                                                       

: 1996                                                                                   

MRN: [id number]                                                                                   

Arrival Date: 10/30/2018                                                                          

Time: 18:08                                                                                       

Account#: [id number]                                                                            

Bed 27                                                                                            

Private MD: None, None                                                                            

ED Physician James Agrawal                                                                      

HPI:                                                                                              

10/30                                                                                             

18:35 This 22 yrs old  Female presents to ER via Ambulatory with complaints of Flu   cindy 

      Symptoms.                                                                                   

18:35 This 22 yrs old  Female presents to ER via Ambulatory with complaints of Flu   cindy 

      Symptoms.                                                                                   

18:35 The patient has shortness of breath with light activity. Onset: The symptoms/episode    cindy 

      began/occurred 2 day(s) ago. Duration: The symptoms are continuous, and are steadily        

      getting worse. The patient's shortness of breath is aggravated by coughing, is              

      alleviated by elevating head, rest. Severity of symptoms: At their worst the symptoms       

      were mild in the emergency department the symptoms are unchanged. The patient reports       

      fever, that was measured at 102 degrees Fahrenheit.                                         

                                                                                                  

OB/GYN:                                                                                           

18:35 LMP N/A - Birth control method                                                          kr2 

                                                                                                  

Historical:                                                                                       

- Allergies:                                                                                      

18:35 No Known Allergies;                                                                     kr2 

- Home Meds:                                                                                      

18:35 Mirena IUD [Active];                                                                    kr2 

- PMHx:                                                                                           

18:35 None;                                                                                   kr2 

- PSHx:                                                                                           

18:35 None;                                                                                   kr2 

                                                                                                  

- Immunization history:: Adult Immunizations unknown.                                             

- Social history:: Smoking status: Patient uses tobacco products, denies chronic                  

  smoking, but will smoke occasionally.                                                           

- Ebola Screening: : No symptoms or risks identified at this time.                                

- Family history:: not pertinent.                                                                 

                                                                                                  

                                                                                                  

ROS:                                                                                              

18:35 Constitutional: Negative for fever, chills, and weight loss, Eyes: Negative for injury, cindy 

      pain, redness, and discharge, ENT: Negative for injury, pain, and discharge, Neck:          

      Negative for injury, pain, and swelling, Cardiovascular: Negative for chest pain,           

      palpitations, and edema, Abdomen/GI: Negative for abdominal pain, nausea, vomiting,         

      diarrhea, and constipation, Back: Negative for injury and pain, : Negative for            

      injury, bleeding, discharge, and swelling, MS/Extremity: Negative for injury and            

      deformity, Skin: Negative for injury, rash, and discoloration, Neuro: Negative for          

      headache, weakness, numbness, tingling, and seizure, Psych: Negative for depression,        

      anxiety, suicide ideation, homicidal ideation, and hallucinations, Allergy/Immunology:      

      Negative for hives, rash, and allergies, Endocrine: Negative for neck swelling,             

      polydipsia, polyuria, polyphagia, and marked weight changes, Hematologic/Lymphatic:         

      Negative for swollen nodes, abnormal bleeding, and unusual bruising.                        

18:35 Respiratory: Positive for cough, with no reported sputum.                                   

                                                                                                  

Exam:                                                                                             

18:35 Head/Face:  Normocephalic, atraumatic. Eyes:  Pupils equal round and reactive to light, cindy 

      extra-ocular motions intact.  Lids and lashes normal.  Conjunctiva and sclera are           

      non-icteric and not injected.  Cornea within normal limits.  Periorbital areas with no      

      swelling, redness, or edema. ENT:  Nares patent. No nasal discharge, no septal              

      abnormalities noted.  Tympanic membranes are normal and external auditory canals are        

      clear.  Oropharynx with no redness, swelling, or masses, exudates, or evidence of           

      obstruction, uvula midline.  Mucous membranes moist. Neck:  Trachea midline, no             

      thyromegaly or masses palpated, and no cervical lymphadenopathy.  Supple, full range of     

      motion without nuchal rigidity, or vertebral point tenderness.  No Meningismus.             

      Chest/axilla:  Normal chest wall appearance and motion.  Nontender with no deformity.       

      No lesions are appreciated. Cardiovascular:  Regular rate and rhythm with a normal S1       

      and S2.  No gallops, murmurs, or rubs.  Normal PMI, no JVD.  No pulse deficits.             

      Respiratory:  Lungs have equal breath sounds bilaterally, clear to auscultation and         

      percussion.  No rales, rhonchi or wheezes noted.  No increased work of breathing, no        

      retractions or nasal flaring. Abdomen/GI:  Soft, non-tender, with normal bowel sounds.      

      No distension or tympany.  No guarding or rebound.  No evidence of tenderness               

      throughout. Back:  No spinal tenderness.  No costovertebral tenderness.  Full range of      

      motion. Skin:  Warm, dry with normal turgor.  Normal color with no rashes, no lesions,      

      and no evidence of cellulitis. MS/ Extremity:  Pulses equal, no cyanosis.                   

      Neurovascular intact.  Full, normal range of motion. Neuro:  Awake and alert, GCS 15,       

      oriented to person, place, time, and situation.  Cranial nerves II-XII grossly intact.      

      Motor strength 5/5 in all extremities.  Sensory grossly intact.  Cerebellar exam            

      normal.  Normal gait. Psych:  Awake, alert, with orientation to person, place and time.     

       Behavior, mood, and affect are within normal limits.                                       

18:35 Constitutional: The patient appears febrile.                                                

18:35 Neck: ROM/movement: is normal, no acute changes, Meningeal signs: are not present,          

      Kernig's sign is negative, Brudzinski's sign is negative.                                   

18:35 Respiratory: the patient does not display signs of respiratory distress,  Respirations:     

      normal, Breath sounds: are clear throughout.                                                

                                                                                                  

Vital Signs:                                                                                      

18:35  / 90; Pulse 88; Resp 17; Temp 98.3; Pulse Ox 99% ; Weight 76.2 kg; Height 5 ft.  kr2 

      4 in. (162.56 cm); Pain 3/10;                                                               

18:35 Body Mass Index 28.84 (76.20 kg, 162.56 cm)                                             kr2 

                                                                                                  

MDM:                                                                                              

18:20 Patient medically screened.                                                             [facility] 

18:38 Data reviewed: vital signs, nurses notes.                                               [facility] 

                                                                                                  

Administered Medications:                                                                         

18:50 Drug: Zithromax 500 mg Route: PO;                                                       kr2 

18:55 Follow up: Response: Medication administered at discharge.                              kr2 

18:50 Drug: Tamiflu 75 mg Route: PO;                                                          kr2 

19:02 Follow up: Response: Medication administered at discharge.                              2 

                                                                                                  

                                                                                                  

Disposition:                                                                                      

10/30/18 18:39 Discharged to Home. Impression: Fever, unspecified, Acute upper respiratory        

  infection, unspecified.                                                                         

- Condition is Stable.                                                                            

- Discharge Instructions: Fever, Adult, Upper Respiratory Infection, Adult, Cool Mist             

  Vaporizer, Cough, Adult, Easy-to-Read, Cough, Adult.                                            

- Prescriptions for Bromfed DM 2- 30-10 mg/5 mL Oral syrup - take 10 milliliter by ORAL           

  route every 4 hours; 160 milliliter. Zithromax Z- Jayme 250 mg Oral Tablet - take 1               

  tablet by ORAL route as directed for 5 days Day 1 - take two (2) tablets one time.              

  Day 2, 3, 4 , 5 take one (1) tablet once daily.; 6 tablet. Tamiflu 75 mg Oral Capsule           

  - take 1 tablet by ORAL route every 12 hours for 5 days; 10 tablet.                             

- Medication Reconciliation Form, Thank You Letter, Antibiotic Education, Prescription            

  Opioid Use, Work release form form.                                                             

- Follow up: James Agrawal MD; When: 2 - 3 days; Reason: Recheck today's complaints,           

  Continuance of care, Re-evaluation by your physician.                                           

- Problem is new.                                                                                 

- Symptoms have improved.                                                                         

                                                                                                  

                                                                                                  

                                                                                                  

Signatures:                                                                                       

Dispatcher [employer]                           James Carrasquillo MD MD cha Reaves, Karey, RN                       RN   kr2                                                  

                                                                                                  

Corrections: (The following items were deleted from the chart)                                    

19:02 18:39 10/30/2018 18:39 Discharged to Home. Impression: Fever, unspecified; Acute upper  kr2 

      respiratory infection, unspecified. Condition is Stable. Forms are Medication               

      Reconciliation Form, Thank You Letter, Antibiotic Education, Prescription Opioid Use.       

      Follow up: Dr. James Agrawal; When: 2 - 3 days; Reason: Recheck today's complaints,        

      Continuance of care, Re-evaluation by your physician. Problem is new. Symptoms have         

      improved. cindy                                                                               

                                                                                                  

**************************************************************************************************
N/A

## 2021-09-17 NOTE — OB RN DELIVERY SUMMARY - NSSELHIDDEN_OBGYN_ALL_OB_FT
[NS_DeliveryAttending1_OBGYN_ALL_OB_FT:[id number]],[NS_DeliveryRN_OBGYN_ALL_OB_FT:[id number]] [NS_DeliveryAttending1_OBGYN_ALL_OB_FT:[id number]],[NS_DeliveryRN_OBGYN_ALL_OB_FT:[id number]],[NS_DeliveryAssist1_OBGYN_ALL_OB_FT:[id number]]

## 2021-09-17 NOTE — DISCHARGE NOTE OB - CARE PROVIDER_API CALL
Radha Davis (MD)  Obstetrics and Gynecology  [facility] [facility][[address]], [place] [address]  Phone: [phone]  Fax: [phone]  Follow Up Time:

## 2021-09-17 NOTE — OB RN DELIVERY SUMMARY - NS_LABORROOM_OBGYN_ALL_OB_FT
Cm attempted to contact pt's wife - Miguel Angel Arcos [phone] to complete discharge assessment. Pt came from [facility]. CM left a voice message. Awaiting phone call. 10

## 2021-09-17 NOTE — DISCHARGE NOTE OB - PATIENT PORTAL LINK FT
You can access the FollowMyHealth Patient Portal offered by [facility] by registering at the following website: [email or web address]. By joining [employer]’s FollowMyHealth portal, you will also be able to view your health information using other applications (apps) compatible with our system.

## 2021-09-17 NOTE — OB PROVIDER DELIVERY SUMMARY - NSSELHIDDEN_OBGYN_ALL_OB_FT
[NS_DeliveryAttending1_OBGYN_ALL_OB_FT:[id number]==],[NS_DeliveryRN_OBGYN_ALL_OB_FT:[id number]=]

## 2021-09-17 NOTE — DISCHARGE NOTE OB - CARE PLAN
Principal Discharge DX:	Vaginal delivery  Assessment and plan of treatment:	pelvic rest and recovery   1

## 2021-09-17 NOTE — LACTATION INITIAL EVALUATION - LACTATION INTERVENTIONS
initiate/review safe skin-to-skin/initiate/review hand expression/initiate/review techniques for position and latch/initiate/review breast massage/compression/initiate/review alternate feeding method/reviewed components of an effective feeding and at least 8 effective feedings per day required/reviewed importance of monitoring infant diapers, the breastfeeding log, and minimum output each day/reviewed risks of unnecessary formula supplementation/reviewed risks of artificial nipples/reviewed strategies to transition to breastfeeding only/reviewed benefits and recommendations for rooming in/reviewed feeding on demand/by cue at least 8 times a day

## 2021-09-17 NOTE — DISCHARGE NOTE OB - MEDICATION SUMMARY - MEDICATIONS TO TAKE
I will START or STAY ON the medications listed below when I get home from the hospital:    ibuprofen 600 mg oral tablet  -- 1 tab(s) by mouth every 6 hours  -- Indication: For pain    acetaminophen 325 mg oral tablet  -- 3 tab(s) by mouth   -- Indication: For pain   I will START or STAY ON the medications listed below when I get home from the hospital:    ibuprofen 600 mg oral tablet  -- 1 tab(s) by mouth every 6 hours, As Needed  -- Indication: For Vaginal delivery    acetaminophen 325 mg oral tablet  -- 3 tab(s) by mouth every 6 hours, As Needed  -- Indication: For Vaginal delivery

## 2021-09-17 NOTE — OB RN DELIVERY SUMMARY - NS_SEPSISRSKCALC_OBGYN_ALL_OB_FT
EOS calculated successfully. EOS Risk Factor: 0.5/1000 live births ([facility] national incidence); GA=38w1d; Temp=98.6; ROM=11.35; GBS='Negative'; Antibiotics='No antibiotics or any antibiotics < 2 hrs prior to birth'

## 2021-09-17 NOTE — DISCHARGE NOTE OB - MATERIALS PROVIDED
Vaccinations/[facility]  Screening Program/  Immunization Record/Breastfeeding Log/Bottle Feeding Log/Breastfeeding Mother’s Support Group Information/Guide to Postpartum Care/[facility] Hearing Screen Program/Back To Sleep Handout/Shaken Baby Prevention Handout/Breastfeeding Guide and Packet/Birth Certificate Instructions/Discharge Medication Information for Patients and Families Pocket Guide

## 2021-09-17 NOTE — LACTATION INITIAL EVALUATION - INTERVENTION OUTCOME
Recommended more breastfeeding and less formula feeding. Supplementation risks discussed. Strategies reviewed on getting baby on breast more often. Reinforced importance of log./verbalizes understanding/good return demonstration/needs met/Lactation team to follow up

## 2021-09-17 NOTE — OB PROVIDER DELIVERY SUMMARY - NSPROVIDERDELIVERYNOTE_OBGYN_ALL_OB_FT
Fully dilated and pushing, delivered viable female infant apgars 9.9 via . Cord clamped and cut. Placenta delivered intact. Fundus firm. 2nd degree laceration repaired with 2-0 chromic. Sponge and needle count correct x2

## 2021-09-17 NOTE — LACTATION INITIAL EVALUATION - ORAL/MOTOR ACTIVITY
Pt through triage with mom c/o possible insect bite on back. Mom states pt woke up with purple chantell on back this morning. Mom states no hx. Born premature at 36 weeks   normal

## 2021-09-18 VITALS
DIASTOLIC BLOOD PRESSURE: 62 MMHG | SYSTOLIC BLOOD PRESSURE: 105 MMHG | OXYGEN SATURATION: 100 % | RESPIRATION RATE: 18 BRPM | HEART RATE: 72 BPM | TEMPERATURE: 99 F

## 2021-09-18 RX ADMIN — Medication 600 MILLIGRAM(S): at 01:08

## 2021-09-18 RX ADMIN — Medication 1 TABLET(S): at 11:01

## 2021-09-18 RX ADMIN — Medication 600 MILLIGRAM(S): at 12:03

## 2021-09-18 RX ADMIN — Medication 600 MILLIGRAM(S): at 11:01

## 2021-09-18 RX ADMIN — Medication 600 MILLIGRAM(S): at 01:52

## 2021-09-18 RX ADMIN — Medication 600 MILLIGRAM(S): at 06:31

## 2021-09-18 RX ADMIN — MAGNESIUM HYDROXIDE 30 MILLILITER(S): 400 TABLET, CHEWABLE ORAL at 01:08

## 2021-09-18 RX ADMIN — Medication 600 MILLIGRAM(S): at 05:59

## 2021-09-18 NOTE — PROGRESS NOTE ADULT - SUBJECTIVE AND OBJECTIVE BOX
S: Patient doing well. Minimal lochia. Pain controlled.    O: Vital Signs Last 24 Hrs  T(C): 37.1 (18 Sep 2021 13:47), Max: 37.2 (18 Sep 2021 05:51)  T(F): 98.7 (18 Sep 2021 13:47), Max: 98.9 (18 Sep 2021 05:51)  HR: 72 (18 Sep 2021 13:47) (72 - 74)  BP: 105/62 (18 Sep 2021 13:47) (104/61 - 105/62)  BP(mean): --  RR: 18 (18 Sep 2021 13:47) (17 - 18)  SpO2: 100% (18 Sep 2021 13:47) (100% - 100%)    Gen: NAD  Abd: soft, NT, ND, fundus firm below umbilicus  Lochia: moderate  Ext: no tenderness    Labs:

## 2021-09-18 NOTE — PROGRESS NOTE ADULT - ASSESSMENT
Assessment and Plan    Day  1  Vaginal Delivery  She feels well  Continue the current pain medication  Encourage  Ambulation  Encourage regular diet   DVT ppx: SCDs only when not ambulating  She is stable, tolerates a diet and has normal flatus and bowel movements  She will be discharged on Day 1 according to the normal criteria.

## 2021-09-20 ENCOUNTER — NON-APPOINTMENT (OUTPATIENT)
Age: 26
End: 2021-09-20

## 2021-09-20 ENCOUNTER — APPOINTMENT (OUTPATIENT)
Dept: OBGYN | Facility: CLINIC | Age: 26
End: 2021-09-20

## 2021-09-21 ENCOUNTER — NON-APPOINTMENT (OUTPATIENT)
Age: 26
End: 2021-09-21

## 2021-09-27 ENCOUNTER — APPOINTMENT (OUTPATIENT)
Dept: OBGYN | Facility: CLINIC | Age: 26
End: 2021-09-27

## 2021-10-06 NOTE — ED ADULT NURSE NOTE - DRUG PRE-SCREENING (DAST -1)
Statement Selected Eat healthy foods you enjoy. Apixaban/Eliquis DOES NOT have a special diet. Limit your alcohol intake.

## 2021-11-03 ENCOUNTER — APPOINTMENT (OUTPATIENT)
Dept: OBGYN | Facility: CLINIC | Age: 26
End: 2021-11-03
Payer: MEDICAID

## 2021-11-03 VITALS
BODY MASS INDEX: 24.48 KG/M2 | HEIGHT: 62 IN | SYSTOLIC BLOOD PRESSURE: 108 MMHG | WEIGHT: 133 LBS | TEMPERATURE: 97.3 F | HEART RATE: 102 BPM | DIASTOLIC BLOOD PRESSURE: 71 MMHG

## 2021-11-03 PROCEDURE — 99212 OFFICE O/P EST SF 10 MIN: CPT

## 2021-11-03 NOTE — HISTORY OF PRESENT ILLNESS
[Delivery Date: ___] : on [unfilled] [Female] : Delivery History: baby girl [Girl] : baby is a girl [Infant's Name ___] : [unfilled] [___ Lbs] : [unfilled] lbs [___ Oz] : [unfilled] oz [Living at Home] : is currently living at home [Bottle Feeding] : bottle feeding [] : delivered by vaginal delivery [Breastfeeding] : currently nursing [Oral Contraceptives] : oral contraceptives [Back to Normal] : is back to normal in size [Healing Well] : is healing well [Examination Of The Breasts] : breasts are normal [Labia Majora] : labia major [Labia Minora] : labia minora [Normal] : clitoris [No Bleeding] : there was no active vaginal bleeding [No Tenderness] : no rectal tenderness [Nl Sphincter Tone] : normal sphincter tone [Doing Well] : is doing well [No Sign of Infection] : is showing no signs of infection [Excellent Pain Control] : has excellent pain control [None] : None [Resumed Menses] : has not resumed her menses [Resumed [place]] : has not resumed intercourse [Intended Contraception] : the patient does not intended to use contraception postpartum [Cervix Sample Taken] : cervical sample not taken for a Pap smear [FreeTextEntry9] : confirms vaginal exam [de-identified] : postpartum exam [de-identified] : start LoLoEstrin, rto annual

## 2021-12-07 DIAGNOSIS — O26.872 CERVICAL SHORTENING, SECOND TRIMESTER: ICD-10-CM

## 2021-12-07 DIAGNOSIS — Z34.00 ENCOUNTER FOR SUPERVISION OF NORMAL FIRST PREGNANCY, UNSPECIFIED TRIMESTER: ICD-10-CM

## 2021-12-07 DIAGNOSIS — O99.019 ANEMIA COMPLICATING PREGNANCY, UNSPECIFIED TRIMESTER: ICD-10-CM

## 2021-12-08 ENCOUNTER — NON-APPOINTMENT (OUTPATIENT)
Age: 26
End: 2021-12-08

## 2021-12-10 PROBLEM — O26.872 SHORT CERVIX IN SECOND TRIMESTER, ANTEPARTUM: Status: RESOLVED | Noted: 2021-06-02 | Resolved: 2021-12-10

## 2021-12-10 PROBLEM — Z34.00 FIRST PREGNANCY: Status: RESOLVED | Noted: 2021-06-15 | Resolved: 2021-12-10

## 2021-12-10 PROBLEM — O99.019 ANEMIA OF PREGNANCY: Status: RESOLVED | Noted: 2021-07-06 | Resolved: 2021-12-10

## 2021-12-21 NOTE — OB PROVIDER H&P - NS_SONONOTE_OBGYN_ALL_OB_FT
PLEASE INCLUDE MORE SPECIFIC DOCUMENTATION IN YOUR PROGRESS NOTE AND DISCHARGE SUMMARY.  The documentation in this patient's medical record requires additional clarification to ensure that we accurately capture the patients diagnosis(es), treatment and/or severity of illness. Please document to the greatest level of specificity all corresponding diagnoses (either known or suspected) and/or treatment associated with the clinical information described below. confirm presentation

## 2022-01-14 ENCOUNTER — NON-APPOINTMENT (OUTPATIENT)
Age: 27
End: 2022-01-14

## 2022-01-18 ENCOUNTER — APPOINTMENT (OUTPATIENT)
Dept: GASTROENTEROLOGY | Facility: CLINIC | Age: 27
End: 2022-01-18

## 2022-02-14 NOTE — OB PROVIDER H&P - NSTRANFUSIONOBJECTION_GEN_ALL_CORE_SIUH
[As Noted in HPI] : as noted in HPI [Negative] : Endocrine Patient has no objection to blood transfusions.

## 2022-03-03 ENCOUNTER — APPOINTMENT (OUTPATIENT)
Dept: OBGYN | Facility: CLINIC | Age: 27
End: 2022-03-03
Payer: MEDICAID

## 2022-03-03 VITALS
SYSTOLIC BLOOD PRESSURE: 119 MMHG | HEIGHT: 62 IN | WEIGHT: 128 LBS | HEART RATE: 102 BPM | BODY MASS INDEX: 23.55 KG/M2 | DIASTOLIC BLOOD PRESSURE: 65 MMHG

## 2022-03-03 DIAGNOSIS — Z01.419 ENCOUNTER FOR GYNECOLOGICAL EXAMINATION (GENERAL) (ROUTINE) W/OUT ABNORMAL FINDINGS: ICD-10-CM

## 2022-03-03 DIAGNOSIS — Z83.42 FAMILY HISTORY OF FAMILIAL HYPERCHOLESTEROLEMIA: ICD-10-CM

## 2022-03-03 DIAGNOSIS — D56.3 THALASSEMIA MINOR: ICD-10-CM

## 2022-03-03 DIAGNOSIS — N88.3 INCOMPETENCE OF CERVIX UTERI: ICD-10-CM

## 2022-03-03 DIAGNOSIS — K35.32 ACUTE APPENDICITIS W/ PERFORATION AND LOCALIZED PERITONITIS, W/O ABSCESS: ICD-10-CM

## 2022-03-03 PROCEDURE — 99395 PREV VISIT EST AGE 18-39: CPT

## 2022-03-03 RX ORDER — NORETHINDRONE ACETATE AND ETHINYL ESTRADIOL, ETHINYL ESTRADIOL AND FERROUS FUMARATE 1MG-10(24)
1 MG-10 MCG / KIT ORAL DAILY
Qty: 1 | Refills: 3 | Status: DISCONTINUED | COMMUNITY
Start: 2021-12-07 | End: 2022-03-03

## 2022-03-03 RX ORDER — PROGESTERONE 200 MG/1
200 CAPSULE ORAL
Qty: 30 | Refills: 1 | Status: DISCONTINUED | COMMUNITY
Start: 2021-06-02 | End: 2022-03-03

## 2022-03-03 RX ORDER — FERROUS SULFATE TAB EC 324 MG (65 MG FE EQUIVALENT) 324 (65 FE) MG
324 (65 FE) TABLET DELAYED RESPONSE ORAL
Qty: 90 | Refills: 1 | Status: DISCONTINUED | COMMUNITY
Start: 2021-07-06 | End: 2022-03-03

## 2022-03-04 LAB
C TRACH RRNA SPEC QL NAA+PROBE: NOT DETECTED
N GONORRHOEA RRNA SPEC QL NAA+PROBE: NOT DETECTED
SOURCE AMPLIFICATION: NORMAL

## 2022-03-10 LAB — CYTOLOGY CVX/VAG DOC THIN PREP: NORMAL

## 2022-03-25 ENCOUNTER — APPOINTMENT (OUTPATIENT)
Dept: GASTROENTEROLOGY | Facility: CLINIC | Age: 27
End: 2022-03-25
Payer: MEDICAID

## 2022-03-25 VITALS
DIASTOLIC BLOOD PRESSURE: 70 MMHG | SYSTOLIC BLOOD PRESSURE: 115 MMHG | HEIGHT: 62 IN | WEIGHT: 127 LBS | BODY MASS INDEX: 23.37 KG/M2 | HEART RATE: 76 BPM

## 2022-03-25 DIAGNOSIS — R11.2 NAUSEA WITH VOMITING, UNSPECIFIED: ICD-10-CM

## 2022-03-25 PROCEDURE — 99203 OFFICE O/P NEW LOW 30 MIN: CPT

## 2022-03-25 NOTE — REVIEW OF SYSTEMS
[Vomiting] : vomiting [Constipation] : constipation [Diarrhea] : diarrhea [Dizziness] : dizziness [Feelings Of Weakness] : feelings of weakness [Negative] : Heme/Lymph [As Noted in HPI] : as noted in HPI

## 2022-03-25 NOTE — HISTORY OF PRESENT ILLNESS
[Heartburn] : denies heartburn [Nausea] : improved nausea [Vomiting] : improved vomiting [Diarrhea] : improved diarrhea [Yellow Skin Or Eyes (Jaundice)] : denies jaundice [Abdominal Pain] : denies abdominal pain [Abdominal Swelling] : denies abdominal swelling [Rectal Pain] : denies rectal pain [Constipation] : constipation [de-identified] : Jaja presents to the office today for evaluation of nausea, vomiting, and diarrhea.\par \par About two weeks ago, the patient ate dinner (chicken and pasta) at home.  After she ate additional pasta, she vomited all the food.  She was able to sleep that night but the next day, she awoke feeling nauseous and was having nonbloody diarrhea, every 2 hours.  She was vomiting for about one week and started a BRAT diet.  She went to the urgent care twice in the past 2 weeks and was given ondansetron, which she was using twice a day.  She currently feels better but has dizziness.  Her diarrhea resolved about 5 days ago and she has had constipation for the past 2 days.  She denies any fever, abdominal pain, or sick contacts.  \par \par Similar symptoms occurred in January 2022 with nausea, vomiting and diarrhea that lasted 5 days.  She had eaten a similar meal prepared by her father-in-law at that time as well.  She also had nausea and vomiting in November 2021 after she had COVID-19.  The patient previously did not have any GI issues and she denies any family history of GI disorders.  She denies any toxic habits.

## 2022-03-25 NOTE — ASSESSMENT
[FreeTextEntry1] : 1.  Nausea, vomiting, diarrhea, resolving.  Suspect infectious etiology/ food poisoning.  Differential includes gastroparesis/ motility disorder, functional GI disorder, biliary disorder, autoimmune disorder.\par 2.  History of COVID-19 (November 2021).\par 3.  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  Status post appendectomy.\par \par Recs:\par - Advance diet as tolerated.\par - Small, frequent meals.  Avoid overeating.\par - Use pantoprazole 20 mg for 2 weeks.\par - Can try fiber rich foods to stimulate BM.  If not BM by tomorrow, try milk of magnesia.\par - Labs and abdominal US ordered.  Patient was advised that she could wait and perform tests if symptoms were to recur.

## 2022-05-31 ENCOUNTER — APPOINTMENT (OUTPATIENT)
Dept: OBGYN | Facility: CLINIC | Age: 27
End: 2022-05-31

## 2022-09-06 ENCOUNTER — APPOINTMENT (OUTPATIENT)
Dept: OBGYN | Facility: CLINIC | Age: 27
End: 2022-09-06

## 2022-09-06 VITALS
DIASTOLIC BLOOD PRESSURE: 73 MMHG | SYSTOLIC BLOOD PRESSURE: 108 MMHG | HEART RATE: 75 BPM | WEIGHT: 123 LBS | HEIGHT: 62 IN | BODY MASS INDEX: 22.63 KG/M2

## 2022-09-06 DIAGNOSIS — Z30.9 ENCOUNTER FOR CONTRACEPTIVE MANAGEMENT, UNSPECIFIED: ICD-10-CM

## 2022-09-06 PROCEDURE — 99213 OFFICE O/P EST LOW 20 MIN: CPT

## 2022-09-06 RX ORDER — PANTOPRAZOLE 20 MG/1
20 TABLET, DELAYED RELEASE ORAL DAILY
Qty: 30 | Refills: 1 | Status: DISCONTINUED | COMMUNITY
Start: 2022-03-25 | End: 2022-09-06

## 2022-09-26 NOTE — OB PROVIDER H&P - NS_ANESTHECONSULT_OBGYN_ALL_OB
N/A Rifampin Counseling: I discussed with the patient the risks of rifampin including but not limited to liver damage, kidney damage, red-orange body fluids, nausea/vomiting and severe allergy.

## 2023-03-02 ENCOUNTER — APPOINTMENT (OUTPATIENT)
Dept: OBGYN | Facility: CLINIC | Age: 28
End: 2023-03-02
Payer: MEDICAID

## 2023-03-02 VITALS
DIASTOLIC BLOOD PRESSURE: 72 MMHG | HEIGHT: 62 IN | SYSTOLIC BLOOD PRESSURE: 114 MMHG | RESPIRATION RATE: 17 BRPM | HEART RATE: 82 BPM | OXYGEN SATURATION: 98 %

## 2023-03-02 DIAGNOSIS — N94.89 OTHER SPECIFIED CONDITIONS ASSOCIATED WITH FEMALE GENITAL ORGANS AND MENSTRUAL CYCLE: ICD-10-CM

## 2023-03-02 PROCEDURE — 99213 OFFICE O/P EST LOW 20 MIN: CPT

## 2023-03-02 NOTE — CHIEF COMPLAINT
[Urgent Visit] : Urgent Visit [FreeTextEntry1] : This 26 yo presents to discuss medication to stop her cycle for a short time while away; has used OCP in the past extended use, but spotted for awhile. Content on Junel used at present time, is sexually active.

## 2023-04-28 ENCOUNTER — NON-APPOINTMENT (OUTPATIENT)
Age: 28
End: 2023-04-28

## 2023-05-02 ENCOUNTER — APPOINTMENT (OUTPATIENT)
Dept: OBGYN | Facility: CLINIC | Age: 28
End: 2023-05-02

## 2023-05-09 ENCOUNTER — ASOB RESULT (OUTPATIENT)
Age: 28
End: 2023-05-09

## 2023-05-09 ENCOUNTER — APPOINTMENT (OUTPATIENT)
Dept: OBGYN | Facility: CLINIC | Age: 28
End: 2023-05-09
Payer: MEDICAID

## 2023-05-09 VITALS
SYSTOLIC BLOOD PRESSURE: 109 MMHG | WEIGHT: 132.4 LBS | BODY MASS INDEX: 24.37 KG/M2 | DIASTOLIC BLOOD PRESSURE: 75 MMHG | HEIGHT: 62 IN

## 2023-05-09 PROCEDURE — 99212 OFFICE O/P EST SF 10 MIN: CPT | Mod: TH

## 2023-05-21 LAB
ABO + RH PNL BLD: NORMAL
AR GENE MUT ANL BLD/T: NORMAL
B19V IGG SER QL IA: POSITIVE
B19V IGG+IGM SER-IMP: NORMAL
B19V IGM FLD-ACNC: NEGATIVE
BACTERIA UR CULT: NORMAL
BASOPHILS # BLD AUTO: 0.06 K/UL
BASOPHILS NFR BLD AUTO: 0.9 %
BLD GP AB SCN SERPL QL: NORMAL
C TRACH RRNA SPEC QL NAA+PROBE: NOT DETECTED
CFTR MUT TESTED BLD/T: NEGATIVE
CMV IGG SERPL QL: 7.5 U/ML
CMV IGG SERPL-IMP: POSITIVE
CMV IGM SERPL QL: <8 AU/ML
CMV IGM SERPL QL: NEGATIVE
CYTOLOGY CVX/VAG DOC THIN PREP: NORMAL
EOSINOPHIL # BLD AUTO: 0.2 K/UL
EOSINOPHIL NFR BLD AUTO: 3.1 %
FMR1 GENE MUT ANL BLD/T: NORMAL
HBV SURFACE AG SER QL: NONREACTIVE
HCT VFR BLD CALC: 37.4 %
HGB A MFR BLD: 98.1 %
HGB A2 MFR BLD: 1.9 %
HGB BLD-MCNC: 11.6 G/DL
HGB FRACT BLD-IMP: NORMAL
HIV1+2 AB SPEC QL IA.RAPID: NONREACTIVE
IMM GRANULOCYTES NFR BLD AUTO: 0.2 %
LEAD BLD-MCNC: <1 UG/DL
LYMPHOCYTES # BLD AUTO: 2.07 K/UL
LYMPHOCYTES NFR BLD AUTO: 31.9 %
MAN DIFF?: NORMAL
MCHC RBC-ENTMCNC: 24.2 PG
MCHC RBC-ENTMCNC: 31 GM/DL
MCV RBC AUTO: 77.9 FL
MEV IGG FLD QL IA: 95.4 AU/ML
MEV IGG+IGM SER-IMP: POSITIVE
MEV IGM SER QL: <0.91 ISR
MONOCYTES # BLD AUTO: 0.55 K/UL
MONOCYTES NFR BLD AUTO: 8.5 %
N GONORRHOEA RRNA SPEC QL NAA+PROBE: NOT DETECTED
NEUTROPHILS # BLD AUTO: 3.59 K/UL
NEUTROPHILS NFR BLD AUTO: 55.4 %
PLATELET # BLD AUTO: 367 K/UL
RBC # BLD: 4.8 M/UL
RBC # FLD: 17 %
RUBV IGG FLD-ACNC: 3.2 INDEX
RUBV IGG SER-IMP: POSITIVE
SOURCE AMPLIFICATION: NORMAL
T GONDII AB SER-IMP: NEGATIVE
T GONDII AB SER-IMP: NEGATIVE
T GONDII IGG SER QL: <3 IU/ML
T GONDII IGM SER QL: <3 AU/ML
T PALLIDUM AB SER QL IA: NEGATIVE
VZV AB TITR SER: POSITIVE
VZV IGG SER IF-ACNC: 267.1 INDEX
WBC # FLD AUTO: 6.48 K/UL

## 2023-06-07 ENCOUNTER — APPOINTMENT (OUTPATIENT)
Dept: OBGYN | Facility: CLINIC | Age: 28
End: 2023-06-07
Payer: MEDICAID

## 2023-06-07 ENCOUNTER — APPOINTMENT (OUTPATIENT)
Dept: ANTEPARTUM | Facility: CLINIC | Age: 28
End: 2023-06-07
Payer: MEDICAID

## 2023-06-07 ENCOUNTER — ASOB RESULT (OUTPATIENT)
Age: 28
End: 2023-06-07

## 2023-06-07 VITALS
HEART RATE: 114 BPM | BODY MASS INDEX: 24.13 KG/M2 | SYSTOLIC BLOOD PRESSURE: 95 MMHG | DIASTOLIC BLOOD PRESSURE: 63 MMHG | HEIGHT: 62 IN | WEIGHT: 131.1 LBS

## 2023-06-07 PROCEDURE — 76801 OB US < 14 WKS SINGLE FETUS: CPT | Mod: 59

## 2023-06-07 PROCEDURE — 99212 OFFICE O/P EST SF 10 MIN: CPT | Mod: TH

## 2023-06-07 PROCEDURE — 76813 OB US NUCHAL MEAS 1 GEST: CPT

## 2023-06-07 RX ORDER — NORETHINDRONE ACETATE AND ETHINYL ESTRADIOL AND FERROUS FUMARATE 1MG-20(21)
1-20 KIT ORAL
Qty: 3 | Refills: 3 | Status: DISCONTINUED | COMMUNITY
Start: 2022-09-06 | End: 2023-06-07

## 2023-06-07 RX ORDER — NORETHINDRONE ACETATE 5 MG/1
5 TABLET ORAL
Qty: 30 | Refills: 0 | Status: DISCONTINUED | COMMUNITY
Start: 2023-03-02 | End: 2023-06-07

## 2023-06-07 RX ORDER — NORETHINDRONE ACETATE AND ETHINYL ESTRADIOL AND FERROUS FUMARATE 1MG-20(21)
1-20 KIT ORAL
Qty: 90 | Refills: 1 | Status: DISCONTINUED | COMMUNITY
Start: 2022-03-03 | End: 2023-06-07

## 2023-07-05 ENCOUNTER — APPOINTMENT (OUTPATIENT)
Dept: OBGYN | Facility: CLINIC | Age: 28
End: 2023-07-05
Payer: MEDICAID

## 2023-07-05 VITALS
BODY MASS INDEX: 24.29 KG/M2 | DIASTOLIC BLOOD PRESSURE: 69 MMHG | HEART RATE: 94 BPM | WEIGHT: 132 LBS | HEIGHT: 62 IN | SYSTOLIC BLOOD PRESSURE: 101 MMHG

## 2023-07-05 PROCEDURE — 99212 OFFICE O/P EST SF 10 MIN: CPT | Mod: TH

## 2023-07-11 ENCOUNTER — APPOINTMENT (OUTPATIENT)
Dept: ANTEPARTUM | Facility: CLINIC | Age: 28
End: 2023-07-11
Payer: MEDICAID

## 2023-07-11 ENCOUNTER — ASOB RESULT (OUTPATIENT)
Age: 28
End: 2023-07-11

## 2023-07-11 PROCEDURE — 76815 OB US LIMITED FETUS(S): CPT

## 2023-07-11 PROCEDURE — 76817 TRANSVAGINAL US OBSTETRIC: CPT

## 2023-07-15 LAB
AFP MOM: 0.84
AFP VALUE: 30 NG/ML
ALPHA FETOPROTEIN SERUM COMMENT: NORMAL
ALPHA FETOPROTEIN SERUM INTERPRETATION: NORMAL
ALPHA FETOPROTEIN SERUM RESULTS: NORMAL
ALPHA FETOPROTEIN SERUM TEST RESULTS: NORMAL
GESTATIONAL AGE BASED ON: NORMAL
GESTATIONAL AGE ON COLLECTION DATE: 15.6 WEEKS
INSULIN DEP DIABETES: NO
MATERNAL AGE AT EDD AFP: 27.7 YR
MULTIPLE GESTATION: NO
OSBR RISK 1 IN: NORMAL
RACE: NORMAL
WEIGHT AFP: 132 LBS

## 2023-07-28 ENCOUNTER — ASOB RESULT (OUTPATIENT)
Age: 28
End: 2023-07-28

## 2023-07-28 ENCOUNTER — APPOINTMENT (OUTPATIENT)
Dept: ANTEPARTUM | Facility: CLINIC | Age: 28
End: 2023-07-28
Payer: MEDICAID

## 2023-07-28 PROCEDURE — 76817 TRANSVAGINAL US OBSTETRIC: CPT

## 2023-07-28 PROCEDURE — 76815 OB US LIMITED FETUS(S): CPT

## 2023-08-02 ENCOUNTER — APPOINTMENT (OUTPATIENT)
Dept: OBGYN | Facility: CLINIC | Age: 28
End: 2023-08-02
Payer: MEDICAID

## 2023-08-02 VITALS
WEIGHT: 135 LBS | SYSTOLIC BLOOD PRESSURE: 106 MMHG | BODY MASS INDEX: 24.84 KG/M2 | DIASTOLIC BLOOD PRESSURE: 71 MMHG | HEIGHT: 62 IN

## 2023-08-02 PROCEDURE — 99212 OFFICE O/P EST SF 10 MIN: CPT | Mod: TH

## 2023-08-14 ENCOUNTER — ASOB RESULT (OUTPATIENT)
Age: 28
End: 2023-08-14

## 2023-08-14 ENCOUNTER — APPOINTMENT (OUTPATIENT)
Dept: ANTEPARTUM | Facility: CLINIC | Age: 28
End: 2023-08-14
Payer: MEDICAID

## 2023-08-14 PROCEDURE — 76817 TRANSVAGINAL US OBSTETRIC: CPT | Mod: 59

## 2023-08-14 PROCEDURE — 76811 OB US DETAILED SNGL FETUS: CPT

## 2023-08-16 ENCOUNTER — APPOINTMENT (OUTPATIENT)
Dept: OBGYN | Facility: CLINIC | Age: 28
End: 2023-08-16
Payer: MEDICAID

## 2023-08-16 VITALS
BODY MASS INDEX: 25.4 KG/M2 | SYSTOLIC BLOOD PRESSURE: 97 MMHG | HEIGHT: 62 IN | WEIGHT: 138 LBS | DIASTOLIC BLOOD PRESSURE: 63 MMHG | HEART RATE: 98 BPM

## 2023-08-16 PROCEDURE — 99212 OFFICE O/P EST SF 10 MIN: CPT | Mod: TH

## 2023-09-05 ENCOUNTER — APPOINTMENT (OUTPATIENT)
Dept: OBGYN | Facility: CLINIC | Age: 28
End: 2023-09-05
Payer: MEDICAID

## 2023-09-05 VITALS
HEART RATE: 91 BPM | HEIGHT: 62 IN | DIASTOLIC BLOOD PRESSURE: 69 MMHG | BODY MASS INDEX: 25.86 KG/M2 | WEIGHT: 140.5 LBS | SYSTOLIC BLOOD PRESSURE: 101 MMHG

## 2023-09-05 PROCEDURE — 99212 OFFICE O/P EST SF 10 MIN: CPT | Mod: TH

## 2023-09-26 ENCOUNTER — APPOINTMENT (OUTPATIENT)
Dept: OBGYN | Facility: CLINIC | Age: 28
End: 2023-09-26
Payer: MEDICAID

## 2023-09-26 VITALS
HEART RATE: 78 BPM | SYSTOLIC BLOOD PRESSURE: 95 MMHG | DIASTOLIC BLOOD PRESSURE: 60 MMHG | BODY MASS INDEX: 26.31 KG/M2 | HEIGHT: 62 IN | WEIGHT: 143 LBS

## 2023-09-26 VITALS — HEIGHT: 62 IN

## 2023-09-26 PROCEDURE — 99212 OFFICE O/P EST SF 10 MIN: CPT | Mod: TH,25

## 2023-10-09 LAB
CREAT 24H UR-MCNC: 1.1 G/24 H
CREAT 24H UR-MCNC: 1.1 G/24 H
CREAT ?TM UR-MCNC: 104 MG/DL
CREAT ?TM UR-MCNC: 69 MG/DL
GLUCOSE 1H P 50 G GLC PO SERPL-MCNC: 147 MG/DL
HCT VFR BLD CALC: 32 %
HGB BLD-MCNC: 10.1 G/DL
MCHC RBC-ENTMCNC: 24.9 PG
MCHC RBC-ENTMCNC: 31.6 GM/DL
MCV RBC AUTO: 79 FL
PLATELET # BLD AUTO: 291 K/UL
PROT 24H UR-MRATE: 6 MG/DL
PROT 24H UR-MRATE: 7 MG/DL
PROT ?TM UR-MCNC: 24 HR
PROT ?TM UR-MCNC: 24 HR
PROT UR-MCNC: 74 MG/24 H
PROT UR-MCNC: 93 MG/24 H
RBC # BLD: 4.05 M/UL
RBC # FLD: 15.3 %
SPECIMEN VOL 24H UR: 1050 ML
SPECIMEN VOL 24H UR: 1550 ML
T PALLIDUM AB SER QL IA: NEGATIVE
WBC # FLD AUTO: 8.64 K/UL

## 2023-10-10 ENCOUNTER — APPOINTMENT (OUTPATIENT)
Dept: OBGYN | Facility: CLINIC | Age: 28
End: 2023-10-10

## 2023-10-17 ENCOUNTER — APPOINTMENT (OUTPATIENT)
Dept: OBGYN | Facility: CLINIC | Age: 28
End: 2023-10-17
Payer: MEDICAID

## 2023-10-17 VITALS
SYSTOLIC BLOOD PRESSURE: 118 MMHG | HEART RATE: 101 BPM | WEIGHT: 147 LBS | HEIGHT: 62 IN | DIASTOLIC BLOOD PRESSURE: 76 MMHG | BODY MASS INDEX: 27.05 KG/M2

## 2023-10-17 PROCEDURE — 99212 OFFICE O/P EST SF 10 MIN: CPT | Mod: TH,25

## 2023-10-24 ENCOUNTER — APPOINTMENT (OUTPATIENT)
Dept: OBGYN | Facility: CLINIC | Age: 28
End: 2023-10-24
Payer: MEDICAID

## 2023-10-24 VITALS
DIASTOLIC BLOOD PRESSURE: 75 MMHG | SYSTOLIC BLOOD PRESSURE: 116 MMHG | HEIGHT: 62 IN | HEART RATE: 114 BPM | WEIGHT: 146 LBS | BODY MASS INDEX: 26.87 KG/M2

## 2023-10-24 PROCEDURE — 90471 IMMUNIZATION ADMIN: CPT

## 2023-10-24 PROCEDURE — 90682 RIV4 VACC RECOMBINANT DNA IM: CPT

## 2023-10-24 PROCEDURE — 99212 OFFICE O/P EST SF 10 MIN: CPT | Mod: TH,25

## 2023-10-25 ENCOUNTER — APPOINTMENT (OUTPATIENT)
Dept: ANTEPARTUM | Facility: CLINIC | Age: 28
End: 2023-10-25
Payer: MEDICAID

## 2023-10-25 ENCOUNTER — ASOB RESULT (OUTPATIENT)
Age: 28
End: 2023-10-25

## 2023-10-25 ENCOUNTER — APPOINTMENT (OUTPATIENT)
Dept: OBGYN | Facility: CLINIC | Age: 28
End: 2023-10-25

## 2023-10-25 PROCEDURE — 76819 FETAL BIOPHYS PROFIL W/O NST: CPT | Mod: 59

## 2023-10-25 PROCEDURE — 76816 OB US FOLLOW-UP PER FETUS: CPT

## 2023-10-26 ENCOUNTER — APPOINTMENT (OUTPATIENT)
Dept: ANTEPARTUM | Facility: CLINIC | Age: 28
End: 2023-10-26

## 2023-10-26 NOTE — OB RN PATIENT PROFILE - SOURCE OF INFORMATION, OB PROFILE
How Severe Is Your Skin Lesion?: mild
Is This A New Presentation, Or A Follow-Up?: Skin Lesions
Additional History: Patient states he has scaly lesions on his arms he would like to have treated.
patient

## 2023-11-07 ENCOUNTER — APPOINTMENT (OUTPATIENT)
Dept: OBGYN | Facility: CLINIC | Age: 28
End: 2023-11-07

## 2023-11-08 ENCOUNTER — APPOINTMENT (OUTPATIENT)
Dept: OBGYN | Facility: CLINIC | Age: 28
End: 2023-11-08
Payer: MEDICAID

## 2023-11-08 VITALS
DIASTOLIC BLOOD PRESSURE: 75 MMHG | WEIGHT: 149 LBS | BODY MASS INDEX: 27.42 KG/M2 | HEART RATE: 96 BPM | HEIGHT: 62 IN | SYSTOLIC BLOOD PRESSURE: 110 MMHG

## 2023-11-08 PROCEDURE — 90715 TDAP VACCINE 7 YRS/> IM: CPT

## 2023-11-08 PROCEDURE — 90471 IMMUNIZATION ADMIN: CPT

## 2023-11-08 PROCEDURE — 99212 OFFICE O/P EST SF 10 MIN: CPT | Mod: TH,25

## 2023-11-08 RX ORDER — PROGESTERONE 200 MG/1
200 CAPSULE ORAL
Qty: 7 | Refills: 0 | Status: DISCONTINUED | COMMUNITY
Start: 2021-09-01 | End: 2023-11-08

## 2023-11-21 ENCOUNTER — APPOINTMENT (OUTPATIENT)
Dept: OBGYN | Facility: CLINIC | Age: 28
End: 2023-11-21
Payer: MEDICAID

## 2023-11-21 VITALS
HEIGHT: 62 IN | SYSTOLIC BLOOD PRESSURE: 109 MMHG | BODY MASS INDEX: 27.42 KG/M2 | DIASTOLIC BLOOD PRESSURE: 74 MMHG | HEART RATE: 90 BPM | WEIGHT: 149 LBS

## 2023-11-21 PROCEDURE — 99212 OFFICE O/P EST SF 10 MIN: CPT | Mod: TH

## 2023-12-05 ENCOUNTER — NON-APPOINTMENT (OUTPATIENT)
Age: 28
End: 2023-12-05

## 2023-12-05 ENCOUNTER — APPOINTMENT (OUTPATIENT)
Dept: OBGYN | Facility: CLINIC | Age: 28
End: 2023-12-05
Payer: MEDICAID

## 2023-12-05 VITALS
DIASTOLIC BLOOD PRESSURE: 70 MMHG | SYSTOLIC BLOOD PRESSURE: 105 MMHG | HEIGHT: 62 IN | WEIGHT: 153 LBS | BODY MASS INDEX: 28.16 KG/M2 | HEART RATE: 89 BPM

## 2023-12-05 PROCEDURE — 99212 OFFICE O/P EST SF 10 MIN: CPT | Mod: TH

## 2023-12-08 LAB
GP B STREP DNA SPEC QL NAA+PROBE: NOT DETECTED
HCT VFR BLD CALC: 34.7 %
HGB BLD-MCNC: 10.4 G/DL
MCHC RBC-ENTMCNC: 22.8 PG
MCHC RBC-ENTMCNC: 30 GM/DL
MCV RBC AUTO: 75.9 FL
PLATELET # BLD AUTO: 305 K/UL
RBC # BLD: 4.57 M/UL
RBC # FLD: 19.6 %
SOURCE GBS: NORMAL
WBC # FLD AUTO: 10.54 K/UL

## 2023-12-12 ENCOUNTER — APPOINTMENT (OUTPATIENT)
Dept: OBGYN | Facility: CLINIC | Age: 28
End: 2023-12-12
Payer: MEDICAID

## 2023-12-12 ENCOUNTER — NON-APPOINTMENT (OUTPATIENT)
Age: 28
End: 2023-12-12

## 2023-12-12 VITALS
DIASTOLIC BLOOD PRESSURE: 82 MMHG | HEIGHT: 62 IN | SYSTOLIC BLOOD PRESSURE: 119 MMHG | WEIGHT: 148 LBS | BODY MASS INDEX: 27.23 KG/M2 | HEART RATE: 101 BPM

## 2023-12-12 PROCEDURE — 99212 OFFICE O/P EST SF 10 MIN: CPT | Mod: TH

## 2023-12-19 ENCOUNTER — APPOINTMENT (OUTPATIENT)
Dept: OBGYN | Facility: CLINIC | Age: 28
End: 2023-12-19

## 2023-12-19 ENCOUNTER — OUTPATIENT (OUTPATIENT)
Dept: INPATIENT UNIT | Facility: HOSPITAL | Age: 28
LOS: 1 days | Discharge: HOME | End: 2023-12-19

## 2023-12-19 ENCOUNTER — TRANSCRIPTION ENCOUNTER (OUTPATIENT)
Age: 28
End: 2023-12-19

## 2023-12-19 DIAGNOSIS — Z90.49 ACQUIRED ABSENCE OF OTHER SPECIFIED PARTS OF DIGESTIVE TRACT: Chronic | ICD-10-CM

## 2023-12-19 DIAGNOSIS — O26.899 OTHER SPECIFIED PREGNANCY RELATED CONDITIONS, UNSPECIFIED TRIMESTER: ICD-10-CM

## 2023-12-19 DIAGNOSIS — K08.409 PARTIAL LOSS OF TEETH, UNSPECIFIED CAUSE, UNSPECIFIED CLASS: Chronic | ICD-10-CM

## 2023-12-20 VITALS
DIASTOLIC BLOOD PRESSURE: 52 MMHG | RESPIRATION RATE: 16 BRPM | HEART RATE: 92 BPM | TEMPERATURE: 99 F | SYSTOLIC BLOOD PRESSURE: 103 MMHG

## 2023-12-20 NOTE — OB RN TRIAGE NOTE - FALL HARM RISK - UNIVERSAL INTERVENTIONS
Bed in lowest position, wheels locked, appropriate side rails in place/Call bell, personal items and telephone in reach/Instruct patient to call for assistance before getting out of bed or chair/Non-slip footwear when patient is out of bed/[place] to call system/Physically safe environment - no spills, clutter or unnecessary equipment/Purposeful Proactive Rounding/Room/bathroom lighting operational, light cord in reach Bed in lowest position, wheels locked, appropriate side rails in place/Call bell, personal items and telephone in reach/Instruct patient to call for assistance before getting out of bed or chair/Non-slip footwear when patient is out of bed/[place] to call system/Physically safe environment - no spills, clutter or unnecessary equipment/Purposeful Proactive Rounding/Room/bathroom lighting operational, light cord in reach

## 2023-12-21 ENCOUNTER — TRANSCRIPTION ENCOUNTER (OUTPATIENT)
Age: 28
End: 2023-12-21

## 2023-12-21 ENCOUNTER — INPATIENT (INPATIENT)
Facility: HOSPITAL | Age: 28
LOS: 1 days | Discharge: HOME | End: 2023-12-23
Attending: OBSTETRICS & GYNECOLOGY | Admitting: OBSTETRICS & GYNECOLOGY
Payer: MEDICAID

## 2023-12-21 ENCOUNTER — ASOB RESULT (OUTPATIENT)
Age: 28
End: 2023-12-21

## 2023-12-21 ENCOUNTER — APPOINTMENT (OUTPATIENT)
Dept: ANTEPARTUM | Facility: CLINIC | Age: 28
End: 2023-12-21
Payer: MEDICAID

## 2023-12-21 VITALS
HEART RATE: 93 BPM | TEMPERATURE: 98 F | SYSTOLIC BLOOD PRESSURE: 119 MMHG | DIASTOLIC BLOOD PRESSURE: 65 MMHG | RESPIRATION RATE: 14 BRPM

## 2023-12-21 DIAGNOSIS — Z90.49 ACQUIRED ABSENCE OF OTHER SPECIFIED PARTS OF DIGESTIVE TRACT: Chronic | ICD-10-CM

## 2023-12-21 DIAGNOSIS — K08.409 PARTIAL LOSS OF TEETH, UNSPECIFIED CAUSE, UNSPECIFIED CLASS: Chronic | ICD-10-CM

## 2023-12-21 DIAGNOSIS — O42.90 PREMATURE RUPTURE OF MEMBRANES, UNSPECIFIED AS TO LENGTH OF TIME BETWEEN RUPTURE AND ONSET OF LABOR, UNSPECIFIED WEEKS OF GESTATION: ICD-10-CM

## 2023-12-21 DIAGNOSIS — Z53.21 PROCEDURE AND TREATMENT NOT CARRIED OUT DUE TO PATIENT LEAVING PRIOR TO BEING SEEN BY HEALTH CARE PROVIDER: ICD-10-CM

## 2023-12-21 LAB
BASOPHILS # BLD AUTO: 0.07 K/UL — SIGNIFICANT CHANGE UP (ref 0–0.2)
BASOPHILS # BLD AUTO: 0.07 K/UL — SIGNIFICANT CHANGE UP (ref 0–0.2)
BASOPHILS NFR BLD AUTO: 0.9 % — SIGNIFICANT CHANGE UP (ref 0–2)
BASOPHILS NFR BLD AUTO: 0.9 % — SIGNIFICANT CHANGE UP (ref 0–2)
BLD GP AB SCN SERPL QL: NEGATIVE — SIGNIFICANT CHANGE UP
BLD GP AB SCN SERPL QL: NEGATIVE — SIGNIFICANT CHANGE UP
EOSINOPHIL # BLD AUTO: 0.34 K/UL — SIGNIFICANT CHANGE UP (ref 0–0.5)
EOSINOPHIL # BLD AUTO: 0.34 K/UL — SIGNIFICANT CHANGE UP (ref 0–0.5)
EOSINOPHIL NFR BLD AUTO: 4.3 % — SIGNIFICANT CHANGE UP (ref 0–6)
EOSINOPHIL NFR BLD AUTO: 4.3 % — SIGNIFICANT CHANGE UP (ref 0–6)
HCT VFR BLD CALC: 34.3 % — LOW (ref 34.5–45)
HCT VFR BLD CALC: 34.3 % — LOW (ref 34.5–45)
HGB BLD-MCNC: 10.7 G/DL — LOW (ref 11.5–15.5)
HGB BLD-MCNC: 10.7 G/DL — LOW (ref 11.5–15.5)
IANC: 5.22 K/UL — SIGNIFICANT CHANGE UP (ref 1.8–7.4)
IANC: 5.22 K/UL — SIGNIFICANT CHANGE UP (ref 1.8–7.4)
IMM GRANULOCYTES NFR BLD AUTO: 0.6 % — SIGNIFICANT CHANGE UP (ref 0–0.9)
IMM GRANULOCYTES NFR BLD AUTO: 0.6 % — SIGNIFICANT CHANGE UP (ref 0–0.9)
LYMPHOCYTES # BLD AUTO: 1.63 K/UL — SIGNIFICANT CHANGE UP (ref 1–3.3)
LYMPHOCYTES # BLD AUTO: 1.63 K/UL — SIGNIFICANT CHANGE UP (ref 1–3.3)
LYMPHOCYTES # BLD AUTO: 20.8 % — SIGNIFICANT CHANGE UP (ref 13–44)
LYMPHOCYTES # BLD AUTO: 20.8 % — SIGNIFICANT CHANGE UP (ref 13–44)
MCHC RBC-ENTMCNC: 22.9 PG — LOW (ref 27–34)
MCHC RBC-ENTMCNC: 22.9 PG — LOW (ref 27–34)
MCHC RBC-ENTMCNC: 31.2 GM/DL — LOW (ref 32–36)
MCHC RBC-ENTMCNC: 31.2 GM/DL — LOW (ref 32–36)
MCV RBC AUTO: 73.4 FL — LOW (ref 80–100)
MCV RBC AUTO: 73.4 FL — LOW (ref 80–100)
MONOCYTES # BLD AUTO: 0.53 K/UL — SIGNIFICANT CHANGE UP (ref 0–0.9)
MONOCYTES # BLD AUTO: 0.53 K/UL — SIGNIFICANT CHANGE UP (ref 0–0.9)
MONOCYTES NFR BLD AUTO: 6.8 % — SIGNIFICANT CHANGE UP (ref 2–14)
MONOCYTES NFR BLD AUTO: 6.8 % — SIGNIFICANT CHANGE UP (ref 2–14)
NEUTROPHILS # BLD AUTO: 5.22 K/UL — SIGNIFICANT CHANGE UP (ref 1.8–7.4)
NEUTROPHILS # BLD AUTO: 5.22 K/UL — SIGNIFICANT CHANGE UP (ref 1.8–7.4)
NEUTROPHILS NFR BLD AUTO: 66.6 % — SIGNIFICANT CHANGE UP (ref 43–77)
NEUTROPHILS NFR BLD AUTO: 66.6 % — SIGNIFICANT CHANGE UP (ref 43–77)
NRBC # BLD: 0 /100 WBCS — SIGNIFICANT CHANGE UP (ref 0–0)
NRBC # BLD: 0 /100 WBCS — SIGNIFICANT CHANGE UP (ref 0–0)
NRBC # FLD: 0 K/UL — SIGNIFICANT CHANGE UP (ref 0–0)
NRBC # FLD: 0 K/UL — SIGNIFICANT CHANGE UP (ref 0–0)
PLATELET # BLD AUTO: 326 K/UL — SIGNIFICANT CHANGE UP (ref 150–400)
PLATELET # BLD AUTO: 326 K/UL — SIGNIFICANT CHANGE UP (ref 150–400)
RBC # BLD: 4.67 M/UL — SIGNIFICANT CHANGE UP (ref 3.8–5.2)
RBC # BLD: 4.67 M/UL — SIGNIFICANT CHANGE UP (ref 3.8–5.2)
RBC # FLD: 18.8 % — HIGH (ref 10.3–14.5)
RBC # FLD: 18.8 % — HIGH (ref 10.3–14.5)
RH IG SCN BLD-IMP: POSITIVE — SIGNIFICANT CHANGE UP
RH IG SCN BLD-IMP: POSITIVE — SIGNIFICANT CHANGE UP
WBC # BLD: 7.84 K/UL — SIGNIFICANT CHANGE UP (ref 3.8–10.5)
WBC # BLD: 7.84 K/UL — SIGNIFICANT CHANGE UP (ref 3.8–10.5)
WBC # FLD AUTO: 7.84 K/UL — SIGNIFICANT CHANGE UP (ref 3.8–10.5)
WBC # FLD AUTO: 7.84 K/UL — SIGNIFICANT CHANGE UP (ref 3.8–10.5)

## 2023-12-21 PROCEDURE — 76815 OB US LIMITED FETUS(S): CPT | Mod: 26

## 2023-12-21 PROCEDURE — 59409 OBSTETRICAL CARE: CPT | Mod: U9,GC

## 2023-12-21 RX ORDER — IBUPROFEN 200 MG
1 TABLET ORAL
Qty: 0 | Refills: 0 | DISCHARGE

## 2023-12-21 RX ORDER — SIMETHICONE 80 MG/1
80 TABLET, CHEWABLE ORAL EVERY 4 HOURS
Refills: 0 | Status: DISCONTINUED | OUTPATIENT
Start: 2023-12-21 | End: 2023-12-23

## 2023-12-21 RX ORDER — SODIUM CHLORIDE 9 MG/ML
3 INJECTION INTRAMUSCULAR; INTRAVENOUS; SUBCUTANEOUS EVERY 8 HOURS
Refills: 0 | Status: DISCONTINUED | OUTPATIENT
Start: 2023-12-21 | End: 2023-12-23

## 2023-12-21 RX ORDER — KETOROLAC TROMETHAMINE 30 MG/ML
30 SYRINGE (ML) INJECTION ONCE
Refills: 0 | Status: DISCONTINUED | OUTPATIENT
Start: 2023-12-21 | End: 2023-12-21

## 2023-12-21 RX ORDER — OXYTOCIN 10 UNIT/ML
41.67 VIAL (ML) INJECTION
Qty: 20 | Refills: 0 | Status: DISCONTINUED | OUTPATIENT
Start: 2023-12-21 | End: 2023-12-21

## 2023-12-21 RX ORDER — MAGNESIUM HYDROXIDE 400 MG/1
30 TABLET, CHEWABLE ORAL
Refills: 0 | Status: DISCONTINUED | OUTPATIENT
Start: 2023-12-21 | End: 2023-12-23

## 2023-12-21 RX ORDER — DIPHENHYDRAMINE HCL 50 MG
25 CAPSULE ORAL EVERY 6 HOURS
Refills: 0 | Status: DISCONTINUED | OUTPATIENT
Start: 2023-12-21 | End: 2023-12-23

## 2023-12-21 RX ORDER — SODIUM CHLORIDE 9 MG/ML
1000 INJECTION, SOLUTION INTRAVENOUS ONCE
Refills: 0 | Status: COMPLETED | OUTPATIENT
Start: 2023-12-21 | End: 2024-11-18

## 2023-12-21 RX ORDER — SODIUM CHLORIDE 9 MG/ML
1000 INJECTION, SOLUTION INTRAVENOUS ONCE
Refills: 0 | Status: DISCONTINUED | OUTPATIENT
Start: 2023-12-21 | End: 2023-12-21

## 2023-12-21 RX ORDER — BENZOCAINE 10 %
1 GEL (GRAM) MUCOUS MEMBRANE EVERY 6 HOURS
Refills: 0 | Status: DISCONTINUED | OUTPATIENT
Start: 2023-12-21 | End: 2023-12-23

## 2023-12-21 RX ORDER — PRAMOXINE HYDROCHLORIDE 150 MG/15G
1 AEROSOL, FOAM RECTAL EVERY 4 HOURS
Refills: 0 | Status: DISCONTINUED | OUTPATIENT
Start: 2023-12-21 | End: 2023-12-23

## 2023-12-21 RX ORDER — OXYCODONE HYDROCHLORIDE 5 MG/1
5 TABLET ORAL
Refills: 0 | Status: DISCONTINUED | OUTPATIENT
Start: 2023-12-21 | End: 2023-12-23

## 2023-12-21 RX ORDER — AER TRAVELER 0.5 G/1
1 SOLUTION RECTAL; TOPICAL EVERY 4 HOURS
Refills: 0 | Status: DISCONTINUED | OUTPATIENT
Start: 2023-12-21 | End: 2023-12-23

## 2023-12-21 RX ORDER — SODIUM CHLORIDE 9 MG/ML
500 INJECTION, SOLUTION INTRAVENOUS ONCE
Refills: 0 | Status: COMPLETED | OUTPATIENT
Start: 2023-12-21 | End: 2023-12-21

## 2023-12-21 RX ORDER — DIBUCAINE 1 %
1 OINTMENT (GRAM) RECTAL EVERY 6 HOURS
Refills: 0 | Status: DISCONTINUED | OUTPATIENT
Start: 2023-12-21 | End: 2023-12-23

## 2023-12-21 RX ORDER — ACETAMINOPHEN 500 MG
975 TABLET ORAL
Refills: 0 | Status: DISCONTINUED | OUTPATIENT
Start: 2023-12-21 | End: 2023-12-23

## 2023-12-21 RX ORDER — IBUPROFEN 200 MG
600 TABLET ORAL EVERY 6 HOURS
Refills: 0 | Status: COMPLETED | OUTPATIENT
Start: 2023-12-21 | End: 2024-11-18

## 2023-12-21 RX ORDER — OXYCODONE HYDROCHLORIDE 5 MG/1
5 TABLET ORAL ONCE
Refills: 0 | Status: DISCONTINUED | OUTPATIENT
Start: 2023-12-21 | End: 2023-12-23

## 2023-12-21 RX ORDER — SODIUM CHLORIDE 9 MG/ML
1000 INJECTION, SOLUTION INTRAVENOUS
Refills: 0 | Status: DISCONTINUED | OUTPATIENT
Start: 2023-12-21 | End: 2023-12-21

## 2023-12-21 RX ORDER — IBUPROFEN 200 MG
600 TABLET ORAL EVERY 6 HOURS
Refills: 0 | Status: DISCONTINUED | OUTPATIENT
Start: 2023-12-21 | End: 2023-12-23

## 2023-12-21 RX ORDER — OXYTOCIN 10 UNIT/ML
1 VIAL (ML) INJECTION
Qty: 30 | Refills: 0 | Status: DISCONTINUED | OUTPATIENT
Start: 2023-12-21 | End: 2023-12-21

## 2023-12-21 RX ORDER — CHLORHEXIDINE GLUCONATE 213 G/1000ML
1 SOLUTION TOPICAL DAILY
Refills: 0 | Status: DISCONTINUED | OUTPATIENT
Start: 2023-12-21 | End: 2023-12-21

## 2023-12-21 RX ORDER — SODIUM CHLORIDE 9 MG/ML
1000 INJECTION, SOLUTION INTRAVENOUS ONCE
Refills: 0 | Status: COMPLETED | OUTPATIENT
Start: 2023-12-21 | End: 2023-12-21

## 2023-12-21 RX ORDER — LANOLIN
1 OINTMENT (GRAM) TOPICAL EVERY 6 HOURS
Refills: 0 | Status: DISCONTINUED | OUTPATIENT
Start: 2023-12-21 | End: 2023-12-23

## 2023-12-21 RX ORDER — TETANUS TOXOID, REDUCED DIPHTHERIA TOXOID AND ACELLULAR PERTUSSIS VACCINE, ADSORBED 5; 2.5; 8; 8; 2.5 [IU]/.5ML; [IU]/.5ML; UG/.5ML; UG/.5ML; UG/.5ML
0.5 SUSPENSION INTRAMUSCULAR ONCE
Refills: 0 | Status: DISCONTINUED | OUTPATIENT
Start: 2023-12-21 | End: 2023-12-23

## 2023-12-21 RX ORDER — HYDROCORTISONE 1 %
1 OINTMENT (GRAM) TOPICAL EVERY 6 HOURS
Refills: 0 | Status: DISCONTINUED | OUTPATIENT
Start: 2023-12-21 | End: 2023-12-23

## 2023-12-21 RX ORDER — OXYTOCIN 10 UNIT/ML
333.33 VIAL (ML) INJECTION
Qty: 20 | Refills: 0 | Status: DISCONTINUED | OUTPATIENT
Start: 2023-12-21 | End: 2023-12-21

## 2023-12-21 RX ORDER — ACETAMINOPHEN 500 MG
2 TABLET ORAL
Qty: 0 | Refills: 0 | DISCHARGE

## 2023-12-21 RX ORDER — FERROUS SULFATE 325(65) MG
1 TABLET ORAL
Refills: 0 | DISCHARGE

## 2023-12-21 RX ADMIN — Medication 30 MILLIGRAM(S): at 18:20

## 2023-12-21 RX ADMIN — CHLORHEXIDINE GLUCONATE 1 APPLICATION(S): 213 SOLUTION TOPICAL at 13:32

## 2023-12-21 RX ADMIN — SODIUM CHLORIDE 1000 MILLILITER(S): 9 INJECTION, SOLUTION INTRAVENOUS at 12:33

## 2023-12-21 RX ADMIN — SODIUM CHLORIDE 500 MILLILITER(S): 9 INJECTION, SOLUTION INTRAVENOUS at 14:25

## 2023-12-21 RX ADMIN — SODIUM CHLORIDE 125 MILLILITER(S): 9 INJECTION, SOLUTION INTRAVENOUS at 13:20
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
## 2023-12-21 NOTE — OB RN PATIENT PROFILE - NS_SOCIALWORKCONS_OBGYN_ALL_OB
Elidel Counseling: Patient may experience a mild burning sensation during topical application. Elidel is not approved in children less than 2 years of age. There have been case reports of hematologic and skin malignancies in patients using topical calcineurin inhibitors although causality is questionable. No

## 2023-12-21 NOTE — OB RN DELIVERY SUMMARY - NS_SEPSISRSKCALC_OBGYN_ALL_OB_FT
EOS calculated successfully. EOS Risk Factor: 0.5/1000 live births ([facility] national incidence); GA=39w5d; Temp=98.24; ROM=5.05; GBS='Negative'; Antibiotics='No antibiotics or any antibiotics < 2 hrs prior to birth'   EOS calculated successfully. EOS Risk Factor: 0.5/1000 live births ([facility] national incidence); GA=39w5d; Temp=98.24; ROM=5.05; GBS='Negative'; Antibiotics='No antibiotics or any antibiotics < 2 hrs prior to birth'

## 2023-12-21 NOTE — OB RN PATIENT PROFILE - BP NONINVASIVE DIASTOLIC (MM HG)
"-- Message is from the [facility]--    Reason for Call: Patient wants to know what lab she is to go to for blood test, stated she has no order and needs clarification. If she doesn't answer, patient is requesting that providers leave a message. Caller Information       Type Contact Phone    01/25/2019 09:26 AM Phone (Incoming) Samara Sher (Self) [phone] (H)          Alternative phone number: None     Turnaround time given to caller: ""This message will be sent to [facility] Provider's name]. The clinical team will fulfill your request as soon as they review your message. \""    "
Relayed message to patient.
73

## 2023-12-21 NOTE — OB NEONATOLOGY/PEDIATRICIAN DELIVERY SUMMARY - NSPEDSNEONOTESA_OBGYN_ALL_OB_FT
Peds called to delivery for cat II fetal heart tracing and meconium. 39.5 wk AGA male born via   to a 26y/o  mother. Mother admitted for progression of labor.  Maternal medical/surgical hx of appendectomy in . Maternal ob/gyn hx of  x1. Maternal labs include Blood Type O+ , HIV - , RPR NR , Rubella I , Hep B - , GBS- . SROM at 10:30 on  with clear fluids (ROM hours: 5H3M).  Baby emerged vigorous, crying, was w/d/s/s with APGARS of 8/9 . Mom plans to initiate breastfeeding or formula feed, consents Hep B vaccine and consents circ.  Highest maternal temp: 36.6. EOS 0.06. Admitted to  nursery.     Physical Exam:  Gen: no acute distress, +grimace  HEENT:  anterior fontanel open soft and flat, nondysmorphic facies, no cleft lip/palate, ears normal set, no ear pits or tags, nares clinically patent  Resp: Normal respiratory effort without grunting or retractions, good air entry b/l, clear to auscultation bilaterally  Cardio: Present S1/S2, regular rate and rhythm, no murmurs  Abd: soft, non tender, non distended, umbilical cord with 3 vessels  Neuro: +palmar and plantar grasp, +suck, +michel, normal tone  Extremities: negative gallo and ortolani maneuvers, moving all extremities, no clavicular crepitus or stepoff  Skin: pink, warm  Genitals: Normal male anatomy, testicles palpable in scrotum b/l, Jesus 1, anus patent Peds called to delivery for cat II fetal heart tracing and meconium. 39.5 wk AGA male born via   to a 28y/o  mother. Mother admitted for progression of labor.  Maternal medical/surgical hx of appendectomy in . Maternal ob/gyn hx of  x1. Maternal labs include Blood Type O+ , HIV - , RPR NR , Rubella I , Hep B - , GBS- . SROM at 10:30 on  with clear fluids (ROM hours: 5H3M).  Baby emerged vigorous, crying, was w/d/s/s with APGARS of 8/9 . Mom plans to initiate breastfeeding or formula feed, consents Hep B vaccine and consents circ.  Highest maternal temp: 36.6. EOS 0.06. Admitted to  nursery.     Physical Exam:  Gen: no acute distress, +grimace  HEENT:  anterior fontanel open soft and flat, nondysmorphic facies, no cleft lip/palate, ears normal set, no ear pits or tags, nares clinically patent  Resp: Normal respiratory effort without grunting or retractions, good air entry b/l, clear to auscultation bilaterally  Cardio: Present S1/S2, regular rate and rhythm, no murmurs  Abd: soft, non tender, non distended, umbilical cord with 3 vessels  Neuro: +palmar and plantar grasp, +suck, +michel, normal tone  Extremities: negative gallo and ortolani maneuvers, moving all extremities, no clavicular crepitus or stepoff  Skin: pink, warm  Genitals: Normal male anatomy, testicles palpable in scrotum b/l, Jesus 1, anus patent

## 2023-12-21 NOTE — DISCHARGE NOTE OB - CARE PROVIDERS DIRECT ADDRESSES
[email or web address]

## 2023-12-21 NOTE — OB RN PATIENT PROFILE - NSSDOHINSULT_OBGYN_A_OB
[facility]  Heart Transplant  Progress Note    Patient Name: Suman Hayden  MRN: [id number]  Admission Date: 12/6/2017  Hospital Length of Stay: 4 days  Attending Physician: Jin Franklin MD  Primary Care Provider: Joe Ernst MD  Principal Problem:<principal problem not specified>    Subjective:     Interval History: feeling well s/p 1 unit yesterday. Hb 7.1    Continuous Infusions:   Scheduled Meds:   amLODIPine  10 mg Oral Daily    furosemide  40 mg Oral BID    hydrALAZINE  50 mg Oral Q8H    magnesium oxide  400 mg Oral BID    pantoprazole  40 mg Intravenous BID    potassium chloride 10%  60 mEq Oral Once    pravastatin  20 mg Oral QHS     PRN Meds:sodium chloride, sodium chloride, ALPRAZolam, ondansetron, sodium chloride 0.9%    Review of patient's allergies indicates:  No Known Allergies  Objective:     Vital Signs (Most Recent):  Temp: 97.8 °F (36.6 °C) (12/10/17 0810)  Pulse: 79 (12/10/17 0810)  Resp: 18 (12/10/17 0810)  BP: (!) 82/0 (12/10/17 0810)  SpO2: 95 % (12/10/17 0810) Vital Signs (24h Range):  Temp:  [97.7 °F (36.5 °C)-99.3 °F (37.4 °C)] 97.8 °F (36.6 °C)  Pulse:  [] 79  Resp:  [16-18] 18  SpO2:  [95 %-98 %] 95 %  BP: ()/(0-72) 82/0     Patient Vitals for the past 72 hrs (Last 3 readings):   Weight   12/10/17 0810 60.8 kg (134 lb 0.6 oz)   12/09/17 0751 61.4 kg (135 lb 4 oz)   12/08/17 0800 62 kg (136 lb 11 oz)     Body mass index is 20.38 kg/m².      Intake/Output Summary (Last 24 hours) at 12/10/17 1041  Last data filed at 12/10/17 0810   Gross per 24 hour   Intake              720 ml   Output             2000 ml   Net            -1280 ml       Hemodynamic Parameters:         Physical Exam   Constitutional: He is oriented to person, place, and time. No distress.   HENT:   Head: Normocephalic and atraumatic.   Eyes: No scleral icterus.   Neck: Normal range of motion.   Cardiovascular: Normal rate.    LVAD hum present   Pulmonary/Chest: Effort normal  and breath sounds normal.   Abdominal: Soft. Bowel sounds are normal. He exhibits no distension and no mass. There is no tenderness. There is no rebound and no guarding. No hernia.   [place] with no hemorrhoids, dark brown stools no overt melena or BRB   Musculoskeletal: Normal range of motion. He exhibits no edema.   Neurological: He is alert and oriented to person, place, and time.   Skin: He is not diaphoretic.       Significant Labs:  CBC:    Recent Labs  Lab 12/08/17  2326 12/09/17  0534 12/10/17  0714   WBC 16.66* 16.18* 12.73*   RBC 2.40* 2.42* 2.49*   HGB 6.7* 6.7* 7.1*   HCT 20.2* 21.0* 21.6*    194 156   MCV 84 87 87   MCH 27.9 27.7 28.5   MCHC 33.2 31.9* 32.9     BNP:    Recent Labs  Lab 12/06/17 0954 12/08/17 0542   * 840*     CMP:    Recent Labs  Lab 12/06/17 0954 12/08/17 0542 12/09/17 0534 12/10/17  0714   GLU 93  < > 177* 98 82   CALCIUM 9.0  < > 8.9 8.9 8.5*   ALBUMIN 2.8*  --  2.7*  --   --    PROT 6.5  --  6.0  --   --      < > 141 144 142   K 3.9  < > 4.0 3.9 3.3*   CO2 28  < > 20* 24 28     < > 107 108 108   BUN 19  < > 45* 70* 43*   CREATININE 0.9  < > 1.3 1.6* 1.1   ALKPHOS 61  --  45*  --   --    ALT 8*  --  6*  --   --    AST 14  --  11  --   --    BILITOT 0.5  --  0.8  --   --    < > = values in this interval not displayed.   Coagulation:     Recent Labs  Lab 12/08/17  0542 12/09/17  0534 12/10/17  0714   INR 2.2* 2.8* 2.2*   APTT 26.6 25.4 26.9     LDH:    Recent Labs  Lab 12/08/17  0542 12/09/17  0534 12/10/17  0714    173 163     Microbiology:  Microbiology Results (last 7 days)     ** No results found for the last 168 hours. **          I have reviewed all pertinent labs within the past 24 hours.    Estimated Creatinine Clearance: 56 mL/min (based on SCr of 1.1 mg/dL).    Diagnostic Results:  I have reviewed and interpreted all pertinent imaging results/findings within the past 24 hours.    Assessment and Plan:     No notes on file     Normocytic  anemia    -s/p transfusion of 2 units with appropriate rise; however drop in H/H again 12/8 s/p 2 units and another drop yesterday s/p 1 unit 12/9. Hb 7.1 today.   -Appreciate GI consultation; will plan for VCE on Monday   -Will continue PPI   -Patient given IV iron; had failed oral iron at home at high doses secondary to nausea and constipation          LVAD (left ventricular assist device) present    -HeartMate 3 Implanted 7/27/17 as DT  -INR 2.2 this morning will likely restart coumadin.   -Antiplatelets: aspirin restarted yesterday but will stop again today given drop in H/H this morning    -LDH is stable overall today. Will continue to monitor daily.  -Speed set at 5200 rpm  -Interrogation notable for PI events  -Not listed for OHTx          Atrial tachycardia    -on telemetry; no events overnight         Essential hypertension    -restarted home regimen; will monitor response            Rai Krishna MD  Heart Transplant  [facility]   never

## 2023-12-21 NOTE — OB RN TRIAGE NOTE - FALL HARM RISK - UNIVERSAL INTERVENTIONS
Bed in lowest position, wheels locked, appropriate side rails in place/Call bell, personal items and telephone in reach/Instruct patient to call for assistance before getting out of bed or chair/Non-slip footwear when patient is out of bed/[place] to call system/Physically safe environment - no spills, clutter or unnecessary equipment/Purposeful Proactive Rounding/Room/bathroom lighting operational, light cord in reach Bed in lowest position, wheels locked, appropriate side rails in place/Call bell, personal items and telephone in reach/Instruct patient to call for assistance before getting out of bed or chair/Non-slip footwear when patient is out of bed/[place] to call system/Physically safe environment - no spills, clutter or unnecessary equipment/Purposeful Proactive Rounding/Room/bathroom lighting operational, light cord in reach

## 2023-12-21 NOTE — OB RN PATIENT PROFILE - FALL HARM RISK - UNIVERSAL INTERVENTIONS
Bed in lowest position, wheels locked, appropriate side rails in place/Call bell, personal items and telephone in reach/Instruct patient to call for assistance before getting out of bed or chair/Non-slip footwear when patient is out of bed/[place] to call system/Physically safe environment - no spills, clutter or unnecessary equipment/Purposeful Proactive Rounding/Room/bathroom lighting operational, light cord in reach Bed in lowest position, wheels locked, appropriate side rails in place/Call bell, personal items and telephone in reach/Instruct patient to call for assistance before getting out of bed or chair/Non-slip footwear when patient is out of bed/[place] to call system/Physically safe environment - no spills, clutter or unnecessary equipment/Purposeful Proactive Rounding/Room/bathroom lighting operational, light cord in reach

## 2023-12-21 NOTE — OB RN DELIVERY SUMMARY - NSSELHIDDEN_OBGYN_ALL_OB_FT
[NS_DeliveryAttending1_OBGYN_ALL_OB_FT:[id number]],[NS_CirculateRN2_OBGYN_ALL_OB_FT:[id number]] [NS_DeliveryAttending1_OBGYN_ALL_OB_FT:[id number]],[NS_CirculateRN2_OBGYN_ALL_OB_FT:[id number]=]

## 2023-12-21 NOTE — OB PROVIDER DELIVERY SUMMARY - NSPROVIDERDELIVERYNOTE_OBGYN_ALL_OB_FT
Pt found to be fully dilated with urge to push,  of live born male infant. Head, shoulders and body delivered easily in OA position.  Cord clamping was delayed and then cut uncomplicated. Placenta delivered intact. Vaginal exam revealed intact cervix, vaginal walls, sulci. 2nd degree laceration identified and repaired in usual fashion with vicryl rapide suture. Fundal exam performed with minimal clot evacuated. Fundus and lower uterine segment firm. Excellent hemostasis.     Wandy De Souza, PGY-1 Pt found to be fully dilated with urge to push, and after 3 contractions had  of live born male infant. Head, shoulders and body delivered easily in OA position.  Cord clamping was delayed and then cut uncomplicated. Placenta delivered intact. Vaginal exam revealed intact cervix, vaginal walls, sulci. 2nd degree laceration identified and repaired in usual fashion with vicryl rapide suture. Fundal exam performed with minimal clot evacuated. Fundus and lower uterine segment firm. Excellent hemostasis.     Wandy De Souza, PGY-1    agree Radha Davis MD

## 2023-12-21 NOTE — DISCHARGE NOTE OB - MATERIALS PROVIDED
[place]  Immunization Record/Back To Sleep Handout/Shaken Baby Prevention Handout/Birth Certificate Instructions [place]  Immunization Record/Back To Sleep Handout/Shaken Baby Prevention Handout/Birth Certificate Instructions

## 2023-12-21 NOTE — DISCHARGE NOTE OB - NS MD DC FALL RISK RISK
For information on Fall & Injury Prevention, visit: [email or web address] OR  [email or web address] OR  https://www.cdc.gov/steadi/patient.html For information on Fall & Injury Prevention, visit: [email or web address] OR  [email or web address] OR  https://www.cdc.gov/steadi/patient.html

## 2023-12-21 NOTE — OB PROVIDER DELIVERY SUMMARY - NSSELHIDDEN_OBGYN_ALL_OB_FT
[NS_DeliveryAttending1_OBGYN_ALL_OB_FT:[id number]],[NS_DeliveryAssist1_OBGYN_ALL_OB_FT:[id number]] [NS_DeliveryAttending1_OBGYN_ALL_OB_FT:[id number]],[NS_DeliveryAssist1_OBGYN_ALL_OB_FT:[id number]=]

## 2023-12-21 NOTE — OB PROVIDER TRIAGE NOTE - HISTORY OF PRESENT ILLNESS
27 y/o  @ 39.5 wks gestation presents with c/o SROM @ 1015 moderate amount clear odorless amniotic fluid with painful uterine contractions  every 10 minutes since last night states her pain is 6/10 on pain scale denies any VB reports +FM denies any n/v/d denies any fever or chills ap care uncomplicated thus far   GBS- negative 2023

## 2023-12-21 NOTE — CHART NOTE - NSCHARTNOTEFT_GEN_A_CORE
ATT:  Pt feels more comfortable after epidural bolus. Cervix % effaced -2 station Anticipate start pushing soon. FHR moderate variability. Radha Davis MD

## 2023-12-21 NOTE — DISCHARGE NOTE OB - PATIENT PORTAL LINK FT
You can access the FollowMyHealth Patient Portal offered by Health system by registering at the following website: [email or web address]. By joining [employer]’s FollowMyHealth portal, you will also be able to view your health information using other applications (apps) compatible with our system. You can access the FollowMyHealth Patient Portal offered by [facility] by registering at the following website: [email or web address]. By joining [employer]’s FollowMyHealth portal, you will also be able to view your health information using other applications (apps) compatible with our system.

## 2023-12-21 NOTE — DISCHARGE NOTE OB - CARE PLAN
1 Principal Discharge DX:	Vaginal delivery  Assessment and plan of treatment:	stable, routine postpartum care

## 2023-12-21 NOTE — DISCHARGE NOTE OB - CARE PROVIDER_API CALL
Radha Davis  Obstetrics and Gynecology  [address]  [place] [address]  Phone: [phone]  Fax: [phone]  Follow Up Time:    Radha Davis  Obstetrics and Gynecology  [address]  [place] [address]  Phone: [phone]  Fax: [phone]  Follow Up Time:

## 2023-12-21 NOTE — OB PROVIDER H&P - ASSESSMENT
27 y/o  @ 39.5 wks gestation with SROM @ 1015 & labor for epidural   plan of care d/w dr hidalgo / Dr Matamoros   category 2 FHT  GBS- neg 2023  admit to l&D  SROM & labor @ 39.5 wks gestation for epidural   see admission orders  29 y/o  @ 39.5 wks gestation with SROM @ 1015 & labor for epidural   plan of care d/w dr hidalgo / Dr Matamoros   category 2 FHT  GBS- neg 2023  admit to l&D  SROM & labor @ 39.5 wks gestation for epidural   see admission orders

## 2023-12-21 NOTE — DISCHARGE NOTE OB - HOSPITAL COURSE
The pt presented at 39+ weeks gestation with SROM and in labor. She received epidural for anesthesia and progressed to fully dilated with resultant , male  weight 6 lbs 14 oz Apgars 8,9. Small second degree tear repaired. QBL 67 cc

## 2023-12-21 NOTE — OB PROVIDER TRIAGE NOTE - NSHPPHYSICALEXAM_GEN_ALL_CORE
abdomen: soft, nt on palp  + pooling  SVE: 5/80/-3  T(C): 36.8 (12-21-23 @ 12:02), Max: 36.8 (12-21-23 @ 11:49)  HR: 85 (12-21-23 @ 12:04) (85 - 93)  BP: 117/61 (12-21-23 @ 12:04) (117/61 - 119/65)  RR: 14 (12-21-23 @ 11:49) (14 - 14)  SpO2: --  sono reports in ASOB  sono images in ASOB   OB limited sono   vtx posterior placenta MVP: 2.52 FH- 134 bpm  EFW: 3000 grams   NST non reactive   FH baseline 130 FH varaibility mod +FH accels 2 late decels noted with a return to baseline  toco: every 5 minutes

## 2023-12-22 LAB
T PALLIDUM AB TITR SER: NEGATIVE — SIGNIFICANT CHANGE UP
T PALLIDUM AB TITR SER: NEGATIVE — SIGNIFICANT CHANGE UP

## 2023-12-22 RX ADMIN — SODIUM CHLORIDE 3 MILLILITER(S): 9 INJECTION INTRAMUSCULAR; INTRAVENOUS; SUBCUTANEOUS at 05:29

## 2023-12-22 RX ADMIN — Medication 975 MILLIGRAM(S): at 02:34

## 2023-12-22 RX ADMIN — Medication 1 TABLET(S): at 11:42

## 2023-12-22 RX ADMIN — Medication 975 MILLIGRAM(S): at 03:10

## 2023-12-22 RX ADMIN — Medication 600 MILLIGRAM(S): at 23:48

## 2023-12-22 RX ADMIN — SODIUM CHLORIDE 3 MILLILITER(S): 9 INJECTION INTRAMUSCULAR; INTRAVENOUS; SUBCUTANEOUS at 15:12

## 2023-12-22 RX ADMIN — SODIUM CHLORIDE 3 MILLILITER(S): 9 INJECTION INTRAMUSCULAR; INTRAVENOUS; SUBCUTANEOUS at 00:15

## 2023-12-22 NOTE — LACTATION INITIAL EVALUATION - LACTATION INTERVENTIONS
initiate/review safe skin-to-skin/initiate/review hand expression/initiate/review techniques for position and latch/review techniques to manage sore nipples/engorgement/initiate/review breast massage/compression/reviewed components of an effective feeding and at least 8 effective feedings per day required/reviewed importance of monitoring infant diapers, the breastfeeding log, and minimum output each day/reviewed benefits and recommendations for rooming in/reviewed feeding on demand/by cue at least 8 times a day/reviewed indications of inadequate milk transfer that would require supplementation

## 2023-12-22 NOTE — PROGRESS NOTE ADULT - SUBJECTIVE AND OBJECTIVE BOX
Anesthesia Post-op Note    POD#1 S/P vaginal delivery    Patient is doing well.  OOBAA. Tolerating clears.  Pain is tolerable.  No residual anesthetic issues or complications noted.    Michael Killian CRNA

## 2023-12-22 NOTE — LACTATION INITIAL EVALUATION - INTERVENTION OUTCOME
Assisted with deep latch and positioning in cross cradle, but infant was very sleepy after circumcision. Patient educated about infants less than 24h of age being sleepy. Patient was made aware of cluster feeding that occurs after 24h of life and to be cautious of sleep deprivation. In order to maintain infant and patient safety, patient was instructed to place infant in bassinet or call for assistance as needed. Guide to postpartum and  care book was reviewed. Patient was educated on the nutritional needs of the baby and how many wet and dirty diapers to expect. Recognition of feeding cues and to feed the baby on demand, based on cues,  and at least 8-12 times in a day was discussed. Instructed patient to wake the baby to feed if no feeding cues are seen within 3h since prior feed.  Use of feeding log to record feedings along with wet and dirty diapers was encouraged. Instructed in hand expression with good return demonstration.  Reviewed safe skin to skin. Patient verbalized understanding of  information and education given. All questions and concerns were answered./verbalizes understanding/needs met

## 2023-12-22 NOTE — PROGRESS NOTE ADULT - SUBJECTIVE AND OBJECTIVE BOX
Patient seen and examined at bedside, resting comfortably in no acute distress. Denies fever, chills, nausea or vomiting. She is tolerating a regular diet. She is ambulating without difficulty and voiding spontaneously. Pain is well controlled. Bleeding is less than a normal menses.    Physical Examination:  Vital Signs: Vital Signs Last 24 Hrs  T(C): 37.1 (22 Dec 2023 06:00), Max: 37.2 (22 Dec 2023 02:04)  T(F): 98.7 (22 Dec 2023 06:00), Max: 98.9 (22 Dec 2023 02:04)  HR: 81 (22 Dec 2023 06:00) (64 - 114)  BP: 107/59 (22 Dec 2023 06:00) (91/50 - 139/86)  BP(mean): --  RR: 18 (22 Dec 2023 06:00) (14 - 18)  SpO2: 100% (22 Dec 2023 06:00) (88% - 100%)    Parameters below as of 21 Dec 2023 13:29  Patient On (Oxygen Delivery Method): room air      General: alert and oriented, no acute distress  Cardio: regular rate and rhythm  Respiratory: non labored respirations  Abdomen: soft, non-tender, non-distended, uterus firm, palpated below the umbilicus  VE: minimal lochia noted  Musculoskeletal: No erythema/edema, no calf tenderness bilaterally    MEDICATIONS  (STANDING):  acetaminophen     Tablet .. 975 milliGRAM(s) Oral <User Schedule>  diphtheria/tetanus/pertussis (acellular) Vaccine (Adacel) 0.5 milliLiter(s) IntraMuscular once  ibuprofen  Tablet. 600 milliGRAM(s) Oral every 6 hours  prenatal multivitamin 1 Tablet(s) Oral daily  sodium chloride 0.9% lock flush 3 milliLiter(s) IV Push every 8 hours      Labs:  Blood type: O Positive  Rubella IgG: RPR: Negative                          10.7<L>   7.84 >-----------< 326    (  @ 12:30 )             34.3<L>      Assessment and Plan:   29yo s/p  on  now PPD#1 .  Patient is stable and doing well post-partum.   Blood type: O+    Plan:  - VS per unit protocol  - SCDs for DVT ppx  - Regular diet  - Pain well controlled, continue current pain regimen  - Encourage ambulation  - Discharge instructions reviewed  - D/c planning initiated, patient to follow up in office in 6 weeks for post partum visit    Junior Lawson MD Patient seen and examined at bedside, resting comfortably in no acute distress. Denies fever, chills, nausea or vomiting. She is tolerating a regular diet. She is ambulating without difficulty and voiding spontaneously. Pain is well controlled. Bleeding is less than a normal menses.    Physical Examination:  Vital Signs: Vital Signs Last 24 Hrs  T(C): 37.1 (22 Dec 2023 06:00), Max: 37.2 (22 Dec 2023 02:04)  T(F): 98.7 (22 Dec 2023 06:00), Max: 98.9 (22 Dec 2023 02:04)  HR: 81 (22 Dec 2023 06:00) (64 - 114)  BP: 107/59 (22 Dec 2023 06:00) (91/50 - 139/86)  BP(mean): --  RR: 18 (22 Dec 2023 06:00) (14 - 18)  SpO2: 100% (22 Dec 2023 06:00) (88% - 100%)    Parameters below as of 21 Dec 2023 13:29  Patient On (Oxygen Delivery Method): room air      General: alert and oriented, no acute distress  Cardio: regular rate and rhythm  Respiratory: non labored respirations  Abdomen: soft, non-tender, non-distended, uterus firm, palpated below the umbilicus  VE: minimal lochia noted  Musculoskeletal: No erythema/edema, no calf tenderness bilaterally    MEDICATIONS  (STANDING):  acetaminophen     Tablet .. 975 milliGRAM(s) Oral <User Schedule>  diphtheria/tetanus/pertussis (acellular) Vaccine (Adacel) 0.5 milliLiter(s) IntraMuscular once  ibuprofen  Tablet. 600 milliGRAM(s) Oral every 6 hours  prenatal multivitamin 1 Tablet(s) Oral daily  sodium chloride 0.9% lock flush 3 milliLiter(s) IV Push every 8 hours      Labs:  Blood type: O Positive  Rubella IgG: RPR: Negative                          10.7<L>   7.84 >-----------< 326    (  @ 12:30 )             34.3<L>      Assessment and Plan:   27yo s/p  on  now PPD#1 .  Patient is stable and doing well post-partum.   Blood type: O+    Plan:  - VS per unit protocol  - SCDs for DVT ppx  - Regular diet  - Pain well controlled, continue current pain regimen  - Encourage ambulation  - Discharge instructions reviewed  - D/c planning initiated, patient to follow up in office in 6 weeks for post partum visit    Junior Lawson MD

## 2023-12-23 VITALS
DIASTOLIC BLOOD PRESSURE: 55 MMHG | RESPIRATION RATE: 18 BRPM | SYSTOLIC BLOOD PRESSURE: 110 MMHG | TEMPERATURE: 98 F | HEART RATE: 80 BPM | OXYGEN SATURATION: 100 %

## 2023-12-23 RX ADMIN — Medication 600 MILLIGRAM(S): at 00:20

## 2023-12-26 ENCOUNTER — APPOINTMENT (OUTPATIENT)
Dept: OBGYN | Facility: CLINIC | Age: 28
End: 2023-12-26

## 2023-12-28 ENCOUNTER — APPOINTMENT (OUTPATIENT)
Dept: OBGYN | Facility: CLINIC | Age: 28
End: 2023-12-28

## 2023-12-29 ENCOUNTER — TRANSCRIPTION ENCOUNTER (OUTPATIENT)
Age: 28
End: 2023-12-29

## 2024-02-05 ENCOUNTER — APPOINTMENT (OUTPATIENT)
Dept: OBGYN | Facility: CLINIC | Age: 29
End: 2024-02-05
Payer: MEDICAID

## 2024-02-05 VITALS
HEART RATE: 87 BPM | HEIGHT: 62 IN | WEIGHT: 144 LBS | SYSTOLIC BLOOD PRESSURE: 105 MMHG | BODY MASS INDEX: 26.5 KG/M2 | DIASTOLIC BLOOD PRESSURE: 73 MMHG

## 2024-02-05 VITALS
WEIGHT: 144 LBS | DIASTOLIC BLOOD PRESSURE: 73 MMHG | BODY MASS INDEX: 26.5 KG/M2 | SYSTOLIC BLOOD PRESSURE: 105 MMHG | HEART RATE: 87 BPM | HEIGHT: 62 IN

## 2024-02-05 DIAGNOSIS — Z34.90 ENCOUNTER FOR SUPERVISION OF NORMAL PREGNANCY, UNSPECIFIED, UNSPECIFIED TRIMESTER: ICD-10-CM

## 2024-02-05 DIAGNOSIS — O12.10 GESTATIONAL PROTEINURIA, UNSPECIFIED TRIMESTER: ICD-10-CM

## 2024-02-05 PROCEDURE — 99212 OFFICE O/P EST SF 10 MIN: CPT | Mod: TH,25

## 2024-02-13 NOTE — OB PROVIDER H&P - CLICK TO LAUNCH ORM
Chief Complaint:  Patient is a 80y old  Female who presents with a chief complaint of Severe Sepsis/Colitis/UTI/Lactic Acidosis/ARF/Multiple Electrolyte Derangements (12 Feb 2024 16:23)      HPI/ 24 hr events: Patient seen and examined at bedside. Pt feeling well this morning. Tolerating diet. BM. Denies nausea, vomiting, diarrhea, abdominal pain, hematemesis, hematochezia, melena. Vitals are overall stable, no leukocytosis, Hgb, LFTs stable.       REVIEW OF SYSTEMS:   General: Negative  HEENT: Negative  CV: Negative  Respiratory: Negative  GI: See HPI  : Negative  MSK: Negative  Hematologic: Negative  Skin: Negative    MEDICATIONS:   MEDICATIONS  (STANDING):  albuterol/ipratropium for Nebulization 3 milliLiter(s) Nebulizer every 6 hours  budesonide  80 MICROgram(s)/formoterol 4.5 MICROgram(s) Inhaler 2 Puff(s) Inhalation two times a day  calcium gluconate IVPB 2 Gram(s) IV Intermittent every 6 hours  cefepime  Injectable.      cefepime  Injectable. 1000 milliGRAM(s) IV Push every 12 hours  DULoxetine 60 milliGRAM(s) Oral daily  hydrocortisone sodium succinate Injectable 50 milliGRAM(s) IV Push every 6 hours  metroNIDAZOLE  IVPB 500 milliGRAM(s) IV Intermittent every 8 hours  midodrine. 10 milliGRAM(s) Oral three times a day  pantoprazole    Tablet 40 milliGRAM(s) Oral before breakfast  sodium bicarbonate 650 milliGRAM(s) Oral three times a day  tiotropium 2.5 MICROgram(s) Inhaler 2 Puff(s) Inhalation daily    MEDICATIONS  (PRN):  acetaminophen     Tablet .. 650 milliGRAM(s) Oral every 6 hours PRN Temp greater or equal to 38C (100.4F), Mild Pain (1 - 3)  ALPRAZolam 0.5 milliGRAM(s) Oral four times a day PRN anxiety  aluminum hydroxide/magnesium hydroxide/simethicone Suspension 30 milliLiter(s) Oral every 4 hours PRN Dyspepsia  melatonin 3 milliGRAM(s) Oral at bedtime PRN Insomnia  ondansetron Injectable 4 milliGRAM(s) IV Push every 8 hours PRN Nausea and/or Vomiting            DIET:  Diet, Regular (02-12-24 @ 00:00) [Active]          ALLERGIES:   Allergies    penicillin (Unknown)    Intolerances        VITAL SIGNS:   Vital Signs Last 24 Hrs  T(C): 36.4 (13 Feb 2024 08:04), Max: 36.6 (13 Feb 2024 00:00)  T(F): 97.5 (13 Feb 2024 08:04), Max: 97.8 (13 Feb 2024 00:00)  HR: 98 (13 Feb 2024 08:00) (86 - 117)  BP: 99/70 (13 Feb 2024 08:00) (87/70 - 114/62)  BP(mean): 78 (13 Feb 2024 06:00) (69 - 83)  RR: 18 (13 Feb 2024 08:00) (17 - 20)  SpO2: 99% (13 Feb 2024 08:00) (92% - 100%)    Parameters below as of 13 Feb 2024 08:00  Patient On (Oxygen Delivery Method): nasal cannula  O2 Flow (L/min): 3    I&O's Summary    12 Feb 2024 07:01  -  13 Feb 2024 07:00  --------------------------------------------------------  IN: 1545 mL / OUT: 0 mL / NET: 1545 mL        PHYSICAL EXAM:   GENERAL:  No acute distress  HEENT:  NC/AT, conjunctiva clear, sclera anicteric  CHEST:  No increased effort  HEART:  Regular rate  ABDOMEN:  Soft, non-tender, non-distended, normoactive bowel sounds, no rebound or guarding  EXTREMITIES: No edema  SKIN:  Warm, dry  NEURO:  Calm, cooperative    LABS:                        7.7    17.44 )-----------( 246      ( 13 Feb 2024 06:35 )             24.8     Hemoglobin: 7.7 g/dL (02-13-24 @ 06:35)  Hemoglobin: 8.4 g/dL (02-12-24 @ 06:00)  Hemoglobin: 10.7 g/dL (02-11-24 @ 17:00)    02-13    139  |  108  |  24.4<H>  ----------------------------<  130<H>  4.1   |  15.0<L>  |  1.75<H>    Ca    7.0<L>      13 Feb 2024 06:35  Phos  2.6     02-12  Mg     1.9     02-13    TPro  5.6<L>  /  Alb  2.8<L>  /  TBili  <0.2<L>  /  DBili  x   /  AST  24  /  ALT  7   /  AlkPhos  96  02-12    LIVER FUNCTIONS - ( 12 Feb 2024 06:00 )  Alb: 2.8 g/dL / Pro: 5.6 g/dL / ALK PHOS: 96 U/L / ALT: 7 U/L / AST: 24 U/L / GGT: x             PT/INR - ( 11 Feb 2024 17:00 )   PT: 12.5 sec;   INR: 1.13 ratio         PTT - ( 11 Feb 2024 17:00 )  PTT:33.8 sec    Culture - Urine (collected 11 Feb 2024 21:40)  Source: Clean Catch Clean Catch (Midstream)  Preliminary Report (13 Feb 2024 07:28):    >100,000 CFU/ml Gram Positive Cocci in Pairs and Chains    Culture - Blood (collected 11 Feb 2024 17:00)  Source: .Blood Blood-Peripheral  Preliminary Report (13 Feb 2024 02:02):    No growth at 24 hours    Culture - Blood (collected 11 Feb 2024 16:55)  Source: .Blood Blood-Peripheral  Preliminary Report (13 Feb 2024 02:03):    No growth at 24 hours                                          RADIOLOGY & ADDITIONAL STUDIES:      ACC: [id number] EXAM:  CT CHEST   ORDERED BY: FABIO RAMÍREZ     ACC: [id number] EXAM:  CT ABDOMEN AND PELVIS   ORDERED BY: FABIO RAMÍREZ     PROCEDURE DATE:  02/11/2024          INTERPRETATION:  CLINICAL INFORMATION: Abdominal pain. Diarrhea.    COMPARISON: 12/7/2023 CT abdomen and pelvis and 11/27/2023 CT chest..    CONTRAST/COMPLICATIONS:  IV Contrast: NONE  Oral Contrast: NONE  Complications: None reported at time of study completion    PROCEDURE:  CT of the Chest, Abdomen and Pelvis was performed.  Sagittal and coronal reformats were performed.    FINDINGS:  CHEST:  LUNGS AND LARGE AIRWAYS: Patent central tracheal bronchial tree. Mild   emphysematous changes. Mild residual and nodular patchy opacity of the   right upper lobe, significantly decreased compared to 11/26/2023.   Previously noted patchy nodular opacities in the right lower lobe are   nearly resolved with small residual reticular nodular opacities are   appreciated, for example 4:206, 4:194 and 4:191. There is a 6 mm   branching nodular opacity of the left lower lobe is stable compared to   previous exams dating back to 8/27/2018.. There is mild intralobular   septal thickening. Peripheral reticulations are also appreciated. There   is Posterior dependent atelectasis. Biapical pleural parenchymal scarring   is appreciated.  PLEURA: No pleural effusion. No pneumothorax.  VESSELS: Atherosclerotic changes of the aorta and coronary arteries. The   aorta and pulmonary artery are normal caliber.  HEART: Heart size is normal. Valvular calcifications are noted. No   pericardial effusion.  MEDIASTINUM AND MK: No pathologically enlarged lymph nodes. There is a   small hiatal hernia.  CHEST WALL AND LOWER NECK: Within normal limits.    ABDOMEN AND PELVIS:  LIVER: Within normal limits.  BILE DUCTS: Marked biliary ductal dilatation, the common bile duct   measuring up to 1.3 cm. This is increased compared to previous exam.   There is also dilated cystic duct remnant. Mild intrahepatic biliary duct   dilatation is appreciated.  GALLBLADDER: Cholecystectomy.  SPLEEN: Within normal limits.  PANCREAS: Within normal limits.  ADRENALS: Within normal limits.  KIDNEYS/URETERS: 4 mm right lower pole renal calculus. There is no   urinary tract obstruction.    BLADDER: Diffusely thick-walled.  REPRODUCTIVE ORGANS: Fibroid uterus. No abnormal adnexal enlargement.    BOWEL: No bowel obstruction. Appendix is normal. There is extensive   colonic diverticulosis. Question mild fat stranding of the sigmoid   mesentery . No definite focal wall thickening appreciated. There is a   small hiatal hernia.  PERITONEUM: No ascites.  VESSELS: Atherosclerotic changes. Diffuse ectasia of the abdominal aorta   is appreciated, with multiple with areas of more focal dilatation, the   largest within the infrarenal region measuring up to 2.8 cm in diameter.  RETROPERITONEUM/LYMPH NODES: No lymphadenopathy.  ABDOMINAL WALL: Postsurgical changes.  BONES: Degenerative changes generalized osteopenia. T5 compression   deformity is new compared to previous exam. T7 compression deformity is   relatively stable. Additional compression deformity of T9 is new compared   to previous exam. Chronic appearing L2 compression deformity is stable   compared to previous exam. Subtle areas of linear sclerosis along the   bilateral sacral parallel to the sacroiliac joint may represent slight   insufficiency fractures. Multiple old bilateral rib fractures.    IMPRESSION:  Slight stranding seen adjacent to the sigmoid colon. This may be related   to segment of colitis for which infectious or inflammatory etiology   should be considered. This is also in a region of diverticulosis,   possibility of diverticulitis is raised, though there is no focal   inflamed diverticulum identified.    Biliary duct dilatation, with significant increase in common bile duct   size. This is nonspecific. Further correlation with endoscopy or MRI/MRCP   can be obtained for further delineation.    Thick-walled urinary bladder which may be related to its underdistended   nature versus cystitis. Correlate with urinalysis.    Interval partial resolution of right upper lobe opacity, with residual   smaller nodular densities seen. Follow-up to complete resolution is   advised to exclude underlying nodule/mass. Small residual reticular   nodular densities in previously noted right lower lobe opacities as above.    Mild interlobular septal thickening and peripheral reticulations are   nonspecific but can be seen with edema or other interstitial disease.    Multiple vertebral body compression deformities as above. Compression   deformities of T7 and L2 are stable compared to previous exam.   Compression deformities involving T5 and T9 are new compared to the   previous exam and are of indeterminate age.    Subtle sacral sclerosis parallel to the SI joints, suspicious for   sequelae of sacral insufficiency fractures. This is new when compared to   12/7/2023.    Additional findings as above.        --- End of Report ---            SALLY FUENTES MD; Attending Radiologist  This document has been electronically signed. Feb 11 2024 10:02PM  02-11-24 @ 21:05       Chief Complaint:  Patient is a 80y old  Female who presents with a chief complaint of Severe Sepsis/Colitis/UTI/Lactic Acidosis/ARF/Multiple Electrolyte Derangements (12 Feb 2024 16:23)    Interval HPI/ 24 hr events: Patient seen and examined at bedside. Patientt feeling well this morning, on supplemental oxygen. Tolerating diet. Reports no diarrhea overnight, last bowel movement on admission. Denies nausea, vomiting, abdominal pain, hematemesis, hematochezia, or melena. Patient without any complaints this morning. Leukocytosis improving, afebrile overnight.      REVIEW OF SYSTEMS:   General: Negative  HEENT: Negative  CV: Negative  Respiratory: Negative  GI: See HPI  : Negative  MSK: Negative  Hematologic: Negative  Skin: Negative    MEDICATIONS:   MEDICATIONS  (STANDING):  albuterol/ipratropium for Nebulization 3 milliLiter(s) Nebulizer every 6 hours  budesonide  80 MICROgram(s)/formoterol 4.5 MICROgram(s) Inhaler 2 Puff(s) Inhalation two times a day  calcium gluconate IVPB 2 Gram(s) IV Intermittent every 6 hours  cefepime  Injectable.      cefepime  Injectable. 1000 milliGRAM(s) IV Push every 12 hours  DULoxetine 60 milliGRAM(s) Oral daily  hydrocortisone sodium succinate Injectable 50 milliGRAM(s) IV Push every 6 hours  metroNIDAZOLE  IVPB 500 milliGRAM(s) IV Intermittent every 8 hours  midodrine. 10 milliGRAM(s) Oral three times a day  pantoprazole    Tablet 40 milliGRAM(s) Oral before breakfast  sodium bicarbonate 650 milliGRAM(s) Oral three times a day  tiotropium 2.5 MICROgram(s) Inhaler 2 Puff(s) Inhalation daily    MEDICATIONS  (PRN):  acetaminophen     Tablet .. 650 milliGRAM(s) Oral every 6 hours PRN Temp greater or equal to 38C (100.4F), Mild Pain (1 - 3)  ALPRAZolam 0.5 milliGRAM(s) Oral four times a day PRN anxiety  aluminum hydroxide/magnesium hydroxide/simethicone Suspension 30 milliLiter(s) Oral every 4 hours PRN Dyspepsia  melatonin 3 milliGRAM(s) Oral at bedtime PRN Insomnia  ondansetron Injectable 4 milliGRAM(s) IV Push every 8 hours PRN Nausea and/or Vomiting      DIET:  Diet, Regular (02-12-24 @ 00:00) [Active]      ALLERGIES:   Allergies    penicillin (Unknown)    Intolerances        VITAL SIGNS:   Vital Signs Last 24 Hrs  T(C): 36.4 (13 Feb 2024 08:04), Max: 36.6 (13 Feb 2024 00:00)  T(F): 97.5 (13 Feb 2024 08:04), Max: 97.8 (13 Feb 2024 00:00)  HR: 98 (13 Feb 2024 08:00) (86 - 117)  BP: 99/70 (13 Feb 2024 08:00) (87/70 - 114/62)  BP(mean): 78 (13 Feb 2024 06:00) (69 - 83)  RR: 18 (13 Feb 2024 08:00) (17 - 20)  SpO2: 99% (13 Feb 2024 08:00) (92% - 100%)    Parameters below as of 13 Feb 2024 08:00  Patient On (Oxygen Delivery Method): nasal cannula  O2 Flow (L/min): 3    I&O's Summary    12 Feb 2024 07:01  -  13 Feb 2024 07:00  --------------------------------------------------------  IN: 1545 mL / OUT: 0 mL / NET: 1545 mL        PHYSICAL EXAM:   GENERAL:  No acute distress  HEENT:  NC/AT, conjunctiva clear, sclera anicteric  CHEST:  No increased effort  HEART:  Regular rate  ABDOMEN:  Soft, non-tender, non-distended, normoactive bowel sounds, no rebound or guarding  EXTREMITIES: No edema  SKIN:  Warm, dry  NEURO:  Calm, cooperative    LABS:                        7.7    17.44 )-----------( 246      ( 13 Feb 2024 06:35 )             24.8     Hemoglobin: 7.7 g/dL (02-13-24 @ 06:35)  Hemoglobin: 8.4 g/dL (02-12-24 @ 06:00)  Hemoglobin: 10.7 g/dL (02-11-24 @ 17:00)    02-13    139  |  108  |  24.4<H>  ----------------------------<  130<H>  4.1   |  15.0<L>  |  1.75<H>    Ca    7.0<L>      13 Feb 2024 06:35  Phos  2.6     02-12  Mg     1.9     02-13    TPro  5.6<L>  /  Alb  2.8<L>  /  TBili  <0.2<L>  /  DBili  x   /  AST  24  /  ALT  7   /  AlkPhos  96  02-12    LIVER FUNCTIONS - ( 12 Feb 2024 06:00 )  Alb: 2.8 g/dL / Pro: 5.6 g/dL / ALK PHOS: 96 U/L / ALT: 7 U/L / AST: 24 U/L / GGT: x             PT/INR - ( 11 Feb 2024 17:00 )   PT: 12.5 sec;   INR: 1.13 ratio         PTT - ( 11 Feb 2024 17:00 )  PTT:33.8 sec    Culture - Urine (collected 11 Feb 2024 21:40)  Source: Clean Catch Clean Catch (Midstream)  Preliminary Report (13 Feb 2024 07:28):    >100,000 CFU/ml Gram Positive Cocci in Pairs and Chains    Culture - Blood (collected 11 Feb 2024 17:00)  Source: .Blood Blood-Peripheral  Preliminary Report (13 Feb 2024 02:02):    No growth at 24 hours    Culture - Blood (collected 11 Feb 2024 16:55)  Source: .Blood Blood-Peripheral  Preliminary Report (13 Feb 2024 02:03):    No growth at 24 hours  RADIOLOGY & ADDITIONAL STUDIES:      ACC: [id number] EXAM:  CT CHEST   ORDERED BY: FABIO RAMÍREZ     ACC: [id number] EXAM:  CT ABDOMEN AND PELVIS   ORDERED BY: FABIO RAMÍREZ     PROCEDURE DATE:  02/11/2024          INTERPRETATION:  CLINICAL INFORMATION: Abdominal pain. Diarrhea.    COMPARISON: 12/7/2023 CT abdomen and pelvis and 11/27/2023 CT chest..    CONTRAST/COMPLICATIONS:  IV Contrast: NONE  Oral Contrast: NONE  Complications: None reported at time of study completion    PROCEDURE:  CT of the Chest, Abdomen and Pelvis was performed.  Sagittal and coronal reformats were performed.    FINDINGS:  CHEST:  LUNGS AND LARGE AIRWAYS: Patent central tracheal bronchial tree. Mild   emphysematous changes. Mild residual and nodular patchy opacity of the   right upper lobe, significantly decreased compared to 11/26/2023.   Previously noted patchy nodular opacities in the right lower lobe are   nearly resolved with small residual reticular nodular opacities are   appreciated, for example 4:206, 4:194 and 4:191. There is a 6 mm   branching nodular opacity of the left lower lobe is stable compared to   previous exams dating back to 8/27/2018.. There is mild intralobular   septal thickening. Peripheral reticulations are also appreciated. There   is Posterior dependent atelectasis. Biapical pleural parenchymal scarring   is appreciated.  PLEURA: No pleural effusion. No pneumothorax.  VESSELS: Atherosclerotic changes of the aorta and coronary arteries. The   aorta and pulmonary artery are normal caliber.  HEART: Heart size is normal. Valvular calcifications are noted. No   pericardial effusion.  MEDIASTINUM AND MK: No pathologically enlarged lymph nodes. There is a   small hiatal hernia.  CHEST WALL AND LOWER NECK: Within normal limits.    ABDOMEN AND PELVIS:  LIVER: Within normal limits.  BILE DUCTS: Marked biliary ductal dilatation, the common bile duct   measuring up to 1.3 cm. This is increased compared to previous exam.   There is also dilated cystic duct remnant. Mild intrahepatic biliary duct   dilatation is appreciated.  GALLBLADDER: Cholecystectomy.  SPLEEN: Within normal limits.  PANCREAS: Within normal limits.  ADRENALS: Within normal limits.  KIDNEYS/URETERS: 4 mm right lower pole renal calculus. There is no   urinary tract obstruction.    BLADDER: Diffusely thick-walled.  REPRODUCTIVE ORGANS: Fibroid uterus. No abnormal adnexal enlargement.    BOWEL: No bowel obstruction. Appendix is normal. There is extensive   colonic diverticulosis. Question mild fat stranding of the sigmoid   mesentery . No definite focal wall thickening appreciated. There is a   small hiatal hernia.  PERITONEUM: No ascites.  VESSELS: Atherosclerotic changes. Diffuse ectasia of the abdominal aorta   is appreciated, with multiple with areas of more focal dilatation, the   largest within the infrarenal region measuring up to 2.8 cm in diameter.  RETROPERITONEUM/LYMPH NODES: No lymphadenopathy.  ABDOMINAL WALL: Postsurgical changes.  BONES: Degenerative changes generalized osteopenia. T5 compression   deformity is new compared to previous exam. T7 compression deformity is   relatively stable. Additional compression deformity of T9 is new compared   to previous exam. Chronic appearing L2 compression deformity is stable   compared to previous exam. Subtle areas of linear sclerosis along the   bilateral sacral parallel to the sacroiliac joint may represent slight   insufficiency fractures. Multiple old bilateral rib fractures.    IMPRESSION:  Slight stranding seen adjacent to the sigmoid colon. This may be related   to segment of colitis for which infectious or inflammatory etiology   should be considered. This is also in a region of diverticulosis,   possibility of diverticulitis is raised, though there is no focal   inflamed diverticulum identified.    Biliary duct dilatation, with significant increase in common bile duct   size. This is nonspecific. Further correlation with endoscopy or MRI/MRCP   can be obtained for further delineation.    Thick-walled urinary bladder which may be related to its underdistended   nature versus cystitis. Correlate with urinalysis.    Interval partial resolution of right upper lobe opacity, with residual   smaller nodular densities seen. Follow-up to complete resolution is   advised to exclude underlying nodule/mass. Small residual reticular   nodular densities in previously noted right lower lobe opacities as above.    Mild interlobular septal thickening and peripheral reticulations are   nonspecific but can be seen with edema or other interstitial disease.    Multiple vertebral body compression deformities as above. Compression   deformities of T7 and L2 are stable compared to previous exam.   Compression deformities involving T5 and T9 are new compared to the   previous exam and are of indeterminate age.    Subtle sacral sclerosis parallel to the SI joints, suspicious for   sequelae of sacral insufficiency fractures. This is new when compared to   12/7/2023.    Additional findings as above.    --- End of Report ---      SALLY FUENTES MD; Attending Radiologist  This document has been electronically signed. Feb 11 2024 10:02PM  02-11-24 @ 21:05       .

## 2024-02-18 NOTE — HISTORY OF PRESENT ILLNESS
[Delivery Date: ___] : on [unfilled] [Male] : Delivery History: baby boy [Boy] : baby is a boy [Infant's Name ___] : [unfilled] [___ Lbs] : [unfilled] lbs [___ Oz] : [unfilled] oz [Circumcised] : circumcised [Living at Home] : is currently living at home [Bottle Feeding] : bottle feeding [Intended Contraception] : Intended Contraception: [Breastfeeding] : currently nursing [Oral Contraceptives] : oral contraceptives [Back to Normal] : is back to normal in size [Normal] : the vagina was normal [Examination Of The Breasts] : breasts are normal [Doing Well] : is doing well [None] : None [Resumed Menses] : has not resumed her menses [Resumed [place]] : has not resumed intercourse [No Sign of Infection] : is showing no signs of infection [Excellent Pain Control] : has excellent pain control [de-identified] : Breastfeeding and using formula. [de-identified] : Norethindrone

## 2024-02-29 ENCOUNTER — APPOINTMENT (OUTPATIENT)
Dept: GASTROENTEROLOGY | Facility: CLINIC | Age: 29
End: 2024-02-29
Payer: MEDICAID

## 2024-02-29 VITALS
DIASTOLIC BLOOD PRESSURE: 62 MMHG | WEIGHT: 140 LBS | SYSTOLIC BLOOD PRESSURE: 107 MMHG | HEART RATE: 75 BPM | HEIGHT: 62 IN | BODY MASS INDEX: 25.76 KG/M2

## 2024-02-29 DIAGNOSIS — R19.7 DIARRHEA, UNSPECIFIED: ICD-10-CM

## 2024-02-29 PROCEDURE — 99214 OFFICE O/P EST MOD 30 MIN: CPT

## 2024-02-29 RX ORDER — ONDANSETRON 8 MG/1
8 TABLET ORAL
Qty: 15 | Refills: 0 | Status: ACTIVE | COMMUNITY
Start: 2024-02-29 | End: 1900-01-01

## 2024-02-29 RX ORDER — NORETHINDRONE 0.35 MG/1
0.35 TABLET ORAL DAILY
Qty: 3 | Refills: 3 | Status: DISCONTINUED | COMMUNITY
Start: 2024-02-05 | End: 2024-02-29

## 2024-02-29 RX ORDER — CHLORHEXIDINE GLUCONATE 4 %
325 (65 FE) LIQUID (ML) TOPICAL DAILY
Qty: 90 | Refills: 3 | Status: DISCONTINUED | COMMUNITY
Start: 2023-11-08 | End: 2024-02-29

## 2024-02-29 NOTE — PHYSICAL EXAM
[Alert] : alert [No Acute Distress] : no acute distress [Sclera] : the sclera and conjunctiva were normal [Hearing Threshold Finger Rub Not [place]] : hearing was normal [Normal Appearance] : the appearance of the neck was normal [No Respiratory Distress] : no respiratory distress [Auscultation Breath Sounds / Voice Sounds] : lungs were clear to auscultation bilaterally [Heart Rate And Rhythm] : heart rate was normal and rhythm regular [Normal S1, S2] : normal S1 and S2 [Bowel Sounds] : normal bowel sounds [Abdomen Tenderness] : non-tender [No Masses] : no abdominal mass palpated [Abdomen Soft] : soft [Supraclavicular Lymph Nodes Enlarged Bilaterally] : no supraclavicular lymphadenopathy [Cervical Lymph Nodes Enlarged Anterior Bilaterally] : no anterior cervical lymphadenopathy [Abnormal Walk] : normal gait [Normal Color / Pigmentation] : normal skin color and pigmentation [Normal Turgor] : normal skin turgor [No Focal Deficits] : no focal deficits [Oriented To Time, Place, And Person] : oriented to person, place, and time

## 2024-02-29 NOTE — ASSESSMENT
[FreeTextEntry1] : 1.  Nausea, vomiting, diarrhea.  Suspect infectious etiology given acuity of symptoms but patient has had similar episodes in 2022.  If symptoms become recurrent, would consider celiac disease, functional GI disorder, food intolerance, endometriosis. 2.  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 3.  Status post appendectomy.  Recs: - Check GI PCR if diarrhea persists. - Use famotidine 1-2 times a day for 2 weeks. - Ondansetron PRN nausea. - Small meals.  Advance diet as tolerated. - If symptoms become recurrent, patient should keep track of foods she has eaten and timing in association with menstrual cycle.

## 2024-02-29 NOTE — REASON FOR VISIT
[Consultation] : a consultation visit [FreeTextEntry1] : upper abdomen pain with vomiting and diarrhea

## 2024-02-29 NOTE — HISTORY OF PRESENT ILLNESS
[FreeTextEntry1] : Jaja presents to the office today with complaints of nausea/ vomiting and diarrhea.  She was last seen in the office for similar symptoms in 3/2022.  Earlier this month, the patient developed nausea/ vomiting and diarrhea which lasted for several days.  She was having burning LUQ pain and nausea mostly on an empty stomach in the morning.  The symptoms started to calm down after the first week of February and her PCP advised her that she likely had an infection.  This past weekend, she travelled to [place] and ate out for all her meals.  She has again been feeling nauseous and vomited once over the weekend.  She started to have nonbloody diarrhea again yesterday.  She had 3 loose stools yesterday and 1 loose stool this AM.  She denies any diet, medication change, or sick contacts.    The patient had similar symptoms in 1/2022 and 3/2022.  She does not have any family history of GI disorders and denies any toxic habits.

## 2024-03-27 ENCOUNTER — RX CHANGE (OUTPATIENT)
Age: 29
End: 2024-03-27

## 2024-03-27 RX ORDER — FAMOTIDINE 20 MG/1
20 TABLET, FILM COATED ORAL
Qty: 60 | Refills: 1 | Status: DISCONTINUED | COMMUNITY
Start: 2024-02-29 | End: 2024-03-27

## 2024-03-27 RX ORDER — FAMOTIDINE 20 MG/1
20 TABLET, FILM COATED ORAL
Qty: 180 | Refills: 1 | Status: ACTIVE | COMMUNITY
Start: 1900-01-01 | End: 1900-01-01

## 2024-05-03 NOTE — OB RN PATIENT PROFILE - HISTORY OF COVID-19 VACCINATION
Patient is in the lateral left side position.   The body was positioned using the following devices: gel pad mattress and wedge.  Procedure performed on a bed/cart.The patient was positioned by Ny Saxena RN, Marylou Randhawa RN
Specimens verified per policy.
No

## 2024-06-22 ENCOUNTER — EMERGENCY (EMERGENCY)
Facility: HOSPITAL | Age: 29
LOS: 1 days | Discharge: HOME | End: 2024-06-22
Attending: EMERGENCY MEDICINE
Payer: MEDICAID

## 2024-06-22 VITALS
TEMPERATURE: 98 F | RESPIRATION RATE: 18 BRPM | WEIGHT: 128.09 LBS | OXYGEN SATURATION: 98 % | DIASTOLIC BLOOD PRESSURE: 82 MMHG | HEART RATE: 80 BPM | SYSTOLIC BLOOD PRESSURE: 123 MMHG | HEIGHT: 65 IN

## 2024-06-22 DIAGNOSIS — Z90.49 ACQUIRED ABSENCE OF OTHER SPECIFIED PARTS OF DIGESTIVE TRACT: Chronic | ICD-10-CM

## 2024-06-22 DIAGNOSIS — K08.409 PARTIAL LOSS OF TEETH, UNSPECIFIED CAUSE, UNSPECIFIED CLASS: Chronic | ICD-10-CM

## 2024-06-22 LAB
ALBUMIN SERPL ELPH-MCNC: 4.4 G/DL — SIGNIFICANT CHANGE UP (ref 3.3–5)
ALP SERPL-CCNC: 65 U/L — SIGNIFICANT CHANGE UP (ref 40–120)
ALT FLD-CCNC: 10 U/L — SIGNIFICANT CHANGE UP (ref 10–45)
ANION GAP SERPL CALC-SCNC: 14 MMOL/L — SIGNIFICANT CHANGE UP (ref 5–17)
APPEARANCE UR: CLEAR — SIGNIFICANT CHANGE UP
AST SERPL-CCNC: 19 U/L — SIGNIFICANT CHANGE UP (ref 10–40)
BACTERIA # UR AUTO: NEGATIVE /HPF — SIGNIFICANT CHANGE UP
BASOPHILS # BLD AUTO: 0.05 K/UL — SIGNIFICANT CHANGE UP (ref 0–0.2)
BASOPHILS NFR BLD AUTO: 0.5 % — SIGNIFICANT CHANGE UP (ref 0–2)
BILIRUB SERPL-MCNC: 0.2 MG/DL — SIGNIFICANT CHANGE UP (ref 0.2–1.2)
BILIRUB UR-MCNC: NEGATIVE — SIGNIFICANT CHANGE UP
BUN SERPL-MCNC: 10 MG/DL — SIGNIFICANT CHANGE UP (ref 7–23)
CALCIUM SERPL-MCNC: 9.6 MG/DL — SIGNIFICANT CHANGE UP (ref 8.4–10.5)
CAST: 0 /LPF — SIGNIFICANT CHANGE UP (ref 0–4)
CHLORIDE SERPL-SCNC: 104 MMOL/L — SIGNIFICANT CHANGE UP (ref 96–108)
CO2 SERPL-SCNC: 22 MMOL/L — SIGNIFICANT CHANGE UP (ref 22–31)
COLOR SPEC: YELLOW — SIGNIFICANT CHANGE UP
CREAT SERPL-MCNC: 0.63 MG/DL — SIGNIFICANT CHANGE UP (ref 0.5–1.3)
DIFF PNL FLD: NEGATIVE — SIGNIFICANT CHANGE UP
EGFR: 124 ML/MIN/1.73M2 — SIGNIFICANT CHANGE UP
EOSINOPHIL # BLD AUTO: 0.25 K/UL — SIGNIFICANT CHANGE UP (ref 0–0.5)
EOSINOPHIL NFR BLD AUTO: 2.4 % — SIGNIFICANT CHANGE UP (ref 0–6)
GLUCOSE SERPL-MCNC: 84 MG/DL — SIGNIFICANT CHANGE UP (ref 70–99)
GLUCOSE UR QL: NEGATIVE MG/DL — SIGNIFICANT CHANGE UP
HCG SERPL-ACNC: <2 MIU/ML — SIGNIFICANT CHANGE UP
HCT VFR BLD CALC: 40.1 % — SIGNIFICANT CHANGE UP (ref 34.5–45)
HGB BLD-MCNC: 12.3 G/DL — SIGNIFICANT CHANGE UP (ref 11.5–15.5)
IMM GRANULOCYTES NFR BLD AUTO: 0.3 % — SIGNIFICANT CHANGE UP (ref 0–0.9)
KETONES UR-MCNC: 15 MG/DL
LEUKOCYTE ESTERASE UR-ACNC: ABNORMAL
LIDOCAIN IGE QN: 36 U/L — SIGNIFICANT CHANGE UP (ref 7–60)
LYMPHOCYTES # BLD AUTO: 19 % — SIGNIFICANT CHANGE UP (ref 13–44)
LYMPHOCYTES # BLD AUTO: 2 K/UL — SIGNIFICANT CHANGE UP (ref 1–3.3)
MCHC RBC-ENTMCNC: 24.1 PG — LOW (ref 27–34)
MCHC RBC-ENTMCNC: 30.7 GM/DL — LOW (ref 32–36)
MCV RBC AUTO: 78.5 FL — LOW (ref 80–100)
MONOCYTES # BLD AUTO: 0.55 K/UL — SIGNIFICANT CHANGE UP (ref 0–0.9)
MONOCYTES NFR BLD AUTO: 5.2 % — SIGNIFICANT CHANGE UP (ref 2–14)
NEUTROPHILS # BLD AUTO: 7.62 K/UL — HIGH (ref 1.8–7.4)
NEUTROPHILS NFR BLD AUTO: 72.6 % — SIGNIFICANT CHANGE UP (ref 43–77)
NITRITE UR-MCNC: NEGATIVE — SIGNIFICANT CHANGE UP
NRBC # BLD: 0 /100 WBCS — SIGNIFICANT CHANGE UP (ref 0–0)
PH UR: 6.5 — SIGNIFICANT CHANGE UP (ref 5–8)
PLATELET # BLD AUTO: 354 K/UL — SIGNIFICANT CHANGE UP (ref 150–400)
POTASSIUM SERPL-MCNC: 3.7 MMOL/L — SIGNIFICANT CHANGE UP (ref 3.5–5.3)
POTASSIUM SERPL-SCNC: 3.7 MMOL/L — SIGNIFICANT CHANGE UP (ref 3.5–5.3)
PROT SERPL-MCNC: 7.5 G/DL — SIGNIFICANT CHANGE UP (ref 6–8.3)
PROT UR-MCNC: NEGATIVE MG/DL — SIGNIFICANT CHANGE UP
RBC # BLD: 5.11 M/UL — SIGNIFICANT CHANGE UP (ref 3.8–5.2)
RBC # FLD: 15.7 % — HIGH (ref 10.3–14.5)
RBC CASTS # UR COMP ASSIST: 1 /HPF — SIGNIFICANT CHANGE UP (ref 0–4)
SODIUM SERPL-SCNC: 140 MMOL/L — SIGNIFICANT CHANGE UP (ref 135–145)
SP GR SPEC: 1.03 — SIGNIFICANT CHANGE UP (ref 1–1.03)
SQUAMOUS # UR AUTO: 6 /HPF — HIGH (ref 0–5)
UROBILINOGEN FLD QL: 1 MG/DL — SIGNIFICANT CHANGE UP (ref 0.2–1)
WBC # BLD: 10.5 K/UL — SIGNIFICANT CHANGE UP (ref 3.8–10.5)
WBC # FLD AUTO: 10.5 K/UL — SIGNIFICANT CHANGE UP (ref 3.8–10.5)
WBC UR QL: 6 /HPF — HIGH (ref 0–5)

## 2024-06-22 PROCEDURE — 99285 EMERGENCY DEPT VISIT HI MDM: CPT

## 2024-06-22 PROCEDURE — 93975 VASCULAR STUDY: CPT | Mod: 26

## 2024-06-22 PROCEDURE — 76830 TRANSVAGINAL US NON-OB: CPT | Mod: 26

## 2024-06-22 RX ORDER — KETOROLAC TROMETHAMINE 30 MG/ML
15 SYRINGE (ML) INJECTION ONCE
Refills: 0 | Status: DISCONTINUED | OUTPATIENT
Start: 2024-06-22 | End: 2024-06-22

## 2024-06-22 RX ORDER — ACETAMINOPHEN 500 MG
1000 TABLET ORAL ONCE
Refills: 0 | Status: COMPLETED | OUTPATIENT
Start: 2024-06-22 | End: 2024-06-22

## 2024-06-22 RX ADMIN — Medication 400 MILLIGRAM(S): at 19:46

## 2024-06-22 RX ADMIN — Medication 15 MILLIGRAM(S): at 23:09

## 2024-06-22 NOTE — ED PROVIDER NOTE - PROGRESS NOTE DETAILS
Pt came back from US and pending result. NAD. reviewed us w rads res. fluid is nearly anechoic and very minimally complex. pt  on exam. will reivewed old charts and pt had appy removed 2020. clinically unlikely to be surgical and will give torodol and reassess. perhaps cyst ruptured from trauma?

## 2024-06-22 NOTE — ED PROVIDER NOTE - CLINICAL SUMMARY MEDICAL DECISION MAKING FREE TEXT BOX
Concern for ovarian pathology versus uterine pathology.  CBC CMP lipase urinalysis pelvic sono IV Tylenol.  Not clinically consistent with acute appendicitis or other surgical abdominal pathologies.–Darell Jarrett

## 2024-06-22 NOTE — ED PROVIDER NOTE - NSFOLLOWUPINSTRUCTIONS_ED_ALL_ED_FT
1. You were seen for lower abdominal pain.  You had blood work, ultrasound and cat scan. 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  This will get better with time, ice pack to area, Motrin 600mg every 6 hours for pain.  2. If your symptoms worsen, return to ER for repeat evaluation.  3. Otherwise follow up with your primary this week.  Show your results to them

## 2024-06-22 NOTE — ED PROVIDER NOTE - PHYSICAL EXAMINATION
Awake alert talking no acute distress.  Examination.  Clear lungs.  Nontender abdomen.  Mild suprapubic tenderness left greater than right no tenderness to calf squeeze 2+ dorsalis pedis pulse.  Full range of motion at the hips and the knees without any reproduce of pain.

## 2024-06-22 NOTE — ED ADULT NURSE NOTE - SUICIDE SCREENING QUESTION 1
----- Message from Mildred Augustine sent at 11/7/2017 12:37 PM CST -----  Contact: self @ [phone]  Pt would like to speak with Dr Ivey concerning his medication.  Pt says he has not been able to sleep.  Pt says he has left messages through the office and the portal and had not been able to get someone to return his call.  pls call to discuss asap.    No

## 2024-06-22 NOTE — ED ADULT NURSE NOTE - OBJECTIVE STATEMENT
29yoF denies PMH complains of suprapubic pain, painful urination, numbness in both legs. Patient states symptoms began suddenly today 29yoF denies [facility] complains of suprapubic pain, painful urination, numbness in both legs. Patient states symptoms began suddenly today at 3pm. Patient delivered second child 6 months ago via uncomplicated natural birth. Patient had similar episode of pain 6 weeks postpartum but not as bad as today, did not seek evaluation at the time. Patient denies injury, falls, chest pain, SOB, N/V/D, hematuria, fever, chills, cough.

## 2024-06-22 NOTE — ED PROVIDER NOTE - PATIENT PORTAL LINK FT
You can access the FollowMyHealth Patient Portal offered by [facility] by registering at the following website: [email or web address]. By joining [employer]’s FollowMyHealth portal, you will also be able to view your health information using other applications (apps) compatible with our system.

## 2024-06-22 NOTE — ED PROVIDER NOTE - OBJECTIVE STATEMENT
This is a 29-year-old female who is coming in with suprapubic abdominal pain that started just after having sex at 3 PM.  She is never had pain like this before.  She is a .  No vomiting diarrhea fevers.  No dysuria and no pelvic discharge or bld. last period .  She has never had pain like this before.  She feels like the pain radiates down the top of her legs.  She felt like she was having weakness to walk earlier in the course of the symptoms however that has since resolved.  The pain is moderate.  No abdominal surgeries.

## 2024-06-22 NOTE — ED PROVIDER NOTE - CCCP TRG CHIEF CMPLNT
Patient was bumped from [facility] on 2/15 and rescheduled for 4/15. She is asking that Dr. Shields authorizes a refill for her prenatal vitamins that she has been out of and does not want to be out any longer.   B/L leg cramping

## 2024-06-23 VITALS
SYSTOLIC BLOOD PRESSURE: 103 MMHG | OXYGEN SATURATION: 99 % | TEMPERATURE: 98 F | RESPIRATION RATE: 15 BRPM | DIASTOLIC BLOOD PRESSURE: 70 MMHG | HEART RATE: 67 BPM

## 2024-06-23 PROCEDURE — 80053 COMPREHEN METABOLIC PANEL: CPT

## 2024-06-23 PROCEDURE — 96375 TX/PRO/DX INJ NEW DRUG ADDON: CPT

## 2024-06-23 PROCEDURE — 83690 ASSAY OF LIPASE: CPT

## 2024-06-23 PROCEDURE — 76830 TRANSVAGINAL US NON-OB: CPT

## 2024-06-23 PROCEDURE — 96366 THER/PROPH/DIAG IV INF ADDON: CPT

## 2024-06-23 PROCEDURE — 84702 CHORIONIC GONADOTROPIN TEST: CPT

## 2024-06-23 PROCEDURE — 74177 CT ABD & PELVIS W/CONTRAST: CPT | Mod: 26,MC

## 2024-06-23 PROCEDURE — 96365 THER/PROPH/DIAG IV INF INIT: CPT | Mod: XU

## 2024-06-23 PROCEDURE — 93975 VASCULAR STUDY: CPT

## 2024-06-23 PROCEDURE — 81001 URINALYSIS AUTO W/SCOPE: CPT

## 2024-06-23 PROCEDURE — 85025 COMPLETE CBC W/AUTO DIFF WBC: CPT

## 2024-06-23 PROCEDURE — 74177 CT ABD & PELVIS W/CONTRAST: CPT | Mod: MC

## 2024-06-23 PROCEDURE — 99285 EMERGENCY DEPT VISIT HI MDM: CPT | Mod: 25

## 2024-06-23 RX ADMIN — Medication 1000 MILLIGRAM(S): at 06:01

## 2024-06-23 RX ADMIN — Medication 15 MILLIGRAM(S): at 06:01

## 2024-06-25 ENCOUNTER — APPOINTMENT (OUTPATIENT)
Dept: GASTROENTEROLOGY | Facility: CLINIC | Age: 29
End: 2024-06-25

## 2024-07-08 ENCOUNTER — APPOINTMENT (OUTPATIENT)
Dept: OBGYN | Facility: CLINIC | Age: 29
End: 2024-07-08

## 2024-08-02 ENCOUNTER — APPOINTMENT (OUTPATIENT)
Dept: OBGYN | Facility: CLINIC | Age: 29
End: 2024-08-02
Payer: MEDICAID

## 2024-08-02 VITALS
HEART RATE: 89 BPM | DIASTOLIC BLOOD PRESSURE: 72 MMHG | SYSTOLIC BLOOD PRESSURE: 107 MMHG | BODY MASS INDEX: 25.76 KG/M2 | HEIGHT: 62 IN | WEIGHT: 140 LBS

## 2024-08-02 DIAGNOSIS — N88.3 INCOMPETENCE OF CERVIX UTERI: ICD-10-CM

## 2024-08-02 DIAGNOSIS — Z01.818 ENCOUNTER FOR OTHER PREPROCEDURAL EXAMINATION: ICD-10-CM

## 2024-08-02 DIAGNOSIS — N94.89 OTHER SPECIFIED CONDITIONS ASSOCIATED WITH FEMALE GENITAL ORGANS AND MENSTRUAL CYCLE: ICD-10-CM

## 2024-08-02 DIAGNOSIS — Z01.419 ENCOUNTER FOR GYNECOLOGICAL EXAMINATION (GENERAL) (ROUTINE) W/OUT ABNORMAL FINDINGS: ICD-10-CM

## 2024-08-02 DIAGNOSIS — Z87.898 PERSONAL HISTORY OF OTHER SPECIFIED CONDITIONS: ICD-10-CM

## 2024-08-02 DIAGNOSIS — T81.89XA OTHER COMPLICATIONS OF PROCEDURES, NOT ELSEWHERE CLASSIFIED, INITIAL ENCOUNTER: ICD-10-CM

## 2024-08-02 DIAGNOSIS — Z23 ENCOUNTER FOR IMMUNIZATION: ICD-10-CM

## 2024-08-02 PROCEDURE — 99395 PREV VISIT EST AGE 18-39: CPT

## 2024-08-02 RX ORDER — DESOGESTREL AND ETHINYL ESTRADIOL 0.15-0.03
0.15-3 KIT ORAL DAILY
Qty: 3 | Refills: 3 | Status: ACTIVE | COMMUNITY
Start: 2024-08-02 | End: 1900-01-01

## 2024-08-02 RX ORDER — CHOLECALCIFEROL (VITAMIN D3) 1250 MCG
1.25 MG CAPSULE ORAL
Refills: 0 | Status: ACTIVE | COMMUNITY

## 2024-08-02 NOTE — PLAN
[FreeTextEntry1] : Reviewed RBA to OCP IUD literature in hand Will Rx Apri for now- pt will Sunday start/condoms Billing Dept tasked for IUD coverage RTO prn or for annual

## 2024-08-02 NOTE — HISTORY OF PRESENT ILLNESS
[FreeTextEntry1] : This 29 year old   LMP 7/1/24 presents for annual.  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 No other changes to her medical/surgical hx;  no change to family hx. [PapSmeardate] : March 2022 [TextBox_31] : neg pap

## 2024-08-05 LAB
BV BACTERIA RRNA VAG QL NAA+PROBE: NOT DETECTED
C GLABRATA RNA VAG QL NAA+PROBE: NOT DETECTED
C TRACH RRNA SPEC QL NAA+PROBE: NOT DETECTED
CANDIDA RRNA VAG QL PROBE: NOT DETECTED
HPV HIGH+LOW RISK DNA PNL CVX: NOT DETECTED
N GONORRHOEA RRNA SPEC QL NAA+PROBE: NOT DETECTED
T VAGINALIS RRNA SPEC QL NAA+PROBE: NOT DETECTED

## 2024-08-08 LAB — CYTOLOGY CVX/VAG DOC THIN PREP: NORMAL

## 2024-08-30 NOTE — ED PROVIDER NOTE - DR. NAME
EMERGENCY DEPARTMENT ENCOUNTER  Room Number:  [facility] OR/MAIN OR  PCP: Jenelle Leo APRN  Independent Historians: Patient and Family      HPI:  Chief Complaint: had concerns including Flank Pain.     Context: Ruth Ann Briceno is a 33 y.o. female who presents to the ED c/o acute dysuria and vomiting.  Patient developed dysuria approximately one week ago and was started on bactrim by her ob/gyn.  Patient completed course with no improvement in symptoms.  Patient now reports L sided back pain and one episode of vomiting.  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      Review of prior external notes (non-ED) -and- Review of prior external test results outside of this encounter: Extensive review of the EPIC system as well as [employer] reveals no prior visit notes and no prior diagnostic studies available for review.    Prescription drug monitoring program review:         PAST MEDICAL HISTORY  Active Ambulatory Problems     Diagnosis Date Noted    No Active Ambulatory Problems     Resolved Ambulatory Problems     Diagnosis Date Noted    No Resolved Ambulatory Problems     No Additional Past Medical History         PAST SURGICAL HISTORY  History reviewed. No pertinent surgical history.      FAMILY HISTORY  History reviewed. No pertinent family history.      SOCIAL HISTORY  Social History     Socioeconomic History    Marital status: Single         ALLERGIES  Shellfish-derived products      REVIEW OF SYSTEMS  Review of Systems  Included in HPI  All systems reviewed and negative except for those discussed in HPI.      PHYSICAL EXAM    I have reviewed the triage vital signs and nursing notes.    ED Triage Vitals   Temp Heart Rate Resp BP SpO2   08/30/24 1536 08/30/24 1536 08/30/24 1536 08/30/24 1538 08/30/24 1536   98.9 °F (37.2 °C) 88 18 147/80 97 %      Temp src Heart Rate Source Patient Position BP Location FiO2 (%)   08/30/24 1536 08/30/24 1536  08/30/24 1538 08/30/24 1538 --   Tympanic Monitor Sitting Right arm        Physical Exam  GENERAL: alert, no acute distress  SKIN: Warm, dry  HENT: Normocephalic, atraumatic  EYES: no scleral icterus  CV: regular rhythm, regular rate  RESPIRATORY: normal effort, lungs clear  ABDOMEN: soft, no significant tenderness to palpation  MUSCULOSKELETAL: no deformity. No cva tenderness.  Ecchymosis over the L SI joint.   NEURO: alert, moves all extremities, follows commands            LAB RESULTS  Recent Results (from the past 24 hour(s))   Comprehensive Metabolic Panel    Collection Time: 08/30/24  4:24 PM    Specimen: Arm, Right; Blood   Result Value Ref Range    Glucose 98 65 - 99 mg/dL    BUN 6 6 - 20 mg/dL    Creatinine 0.76 0.57 - 1.00 mg/dL    Sodium 134 (L) 136 - 145 mmol/L    Potassium 4.3 3.5 - 5.2 mmol/L    Chloride 103 98 - 107 mmol/L    CO2 21.0 (L) 22.0 - 29.0 mmol/L    Calcium 9.0 8.6 - 10.5 mg/dL    Total Protein 7.4 6.0 - 8.5 g/dL    Albumin 4.3 3.5 - 5.2 g/dL    ALT (SGPT) 22 1 - 33 U/L    AST (SGOT) 28 1 - 32 U/L    Alkaline Phosphatase 92 39 - 117 U/L    Total Bilirubin <0.2 0.0 - 1.2 mg/dL    Globulin 3.1 gm/dL    A/G Ratio 1.4 g/dL    BUN/Creatinine Ratio 7.9 7.0 - 25.0    Anion Gap 10.0 5.0 - 15.0 mmol/L    eGFR 106.9 >60.0 mL/min/1.73   hCG, Serum, Qualitative    Collection Time: 08/30/24  4:24 PM    Specimen: Arm, Right; Blood   Result Value Ref Range    HCG Qualitative Negative Negative   CBC Auto Differential    Collection Time: 08/30/24  4:24 PM    Specimen: Arm, Right; Blood   Result Value Ref Range    WBC 6.90 3.40 - 10.80 10*3/mm3    RBC 4.10 3.77 - 5.28 10*6/mm3    Hemoglobin 13.0 12.0 - 15.9 g/dL    Hematocrit 38.8 34.0 - 46.6 %    MCV 94.6 79.0 - 97.0 fL    MCH 31.7 26.6 - 33.0 pg    MCHC 33.5 31.5 - 35.7 g/dL    RDW 12.0 (L) 12.3 - 15.4 %    RDW-SD 41.2 37.0 - 54.0 fl    MPV 9.3 6.0 - 12.0 fL    Platelets 229 140 - 450 10*3/mm3    Neutrophil % 72.4 42.7 - 76.0 %    Lymphocyte % 12.2 (L)  19.6 - 45.3 %    Monocyte % 9.9 5.0 - 12.0 %    Eosinophil % 4.6 0.3 - 6.2 %    Basophil % 0.6 0.0 - 1.5 %    Immature Grans % 0.3 0.0 - 0.5 %    Neutrophils, Absolute 5.00 1.70 - 7.00 10*3/mm3    Lymphocytes, Absolute 0.84 0.70 - 3.10 10*3/mm3    Monocytes, Absolute 0.68 0.10 - 0.90 10*3/mm3    Eosinophils, Absolute 0.32 0.00 - 0.40 10*3/mm3    Basophils, Absolute 0.04 0.00 - 0.20 10*3/mm3    Immature Grans, Absolute 0.02 0.00 - 0.05 10*3/mm3    nRBC 0.0 0.0 - 0.2 /100 WBC   Urinalysis With Microscopic If Indicated (No Culture) - Urine, Clean Catch    Collection Time: 08/30/24  4:25 PM    Specimen: Urine, Clean Catch   Result Value Ref Range    Color, UA Yellow Yellow, Straw    Appearance, UA Cloudy (A) Clear    pH, UA 5.5 5.0 - 8.0    Specific Gravity, UA 1.016 1.005 - 1.030    Glucose, UA Negative Negative    Ketones, UA Negative Negative    Bilirubin, UA Negative Negative    Blood, UA Trace (A) Negative    Protein, UA >=300 mg/dL (3+) (A) Negative    Leuk Esterase, UA Trace (A) Negative    Nitrite, UA Negative Negative    Urobilinogen, UA 0.2 E.U./dL 0.2 - 1.0 E.U./dL   Urinalysis, Microscopic Only - Urine, Clean Catch    Collection Time: 08/30/24  4:25 PM    Specimen: Urine, Clean Catch   Result Value Ref Range    RBC, UA 0-2 None Seen, 0-2 /HPF    WBC, UA 11-20 (A) None Seen, 0-2 /HPF    Bacteria, UA 1+ (A) None Seen /HPF    Squamous Epithelial Cells, UA 13-20 (A) None Seen, 0-2 /HPF    Hyaline Casts, UA 0-2 None Seen /LPF    Methodology Automated Microscopy    Lactic Acid, Plasma    Collection Time: 08/30/24  8:05 PM    Specimen: Arm, Right; Blood   Result Value Ref Range    Lactate 0.9 0.5 - 2.0 mmol/L         RADIOLOGY  CT Abdomen Pelvis With Contrast    Result Date: 8/30/2024  CT ABDOMEN PELVIS W CONTRAST-  DATE OF EXAM: 8/30/2024 6:24 PM  INDICATION: flank pain, dysuria.  COMPARISON: None available.  TECHNIQUE: Multiple contiguous axial images were acquired through the abdomen and pelvis following the  intravenous administration of 85 mL of Isovue-300. Reformatted coronal and sagittal sequences were also reviewed. Radiation dose reduction techniques were utilized, including automated exposure control and exposure modulation based on body size.  FINDINGS: Mild multifocal bibasilar subsegmental atelectasis and/or scarring.  The liver, gallbladder, spleen, pancreas and adrenal glands, and right kidney are unremarkable. Mild left pelviectasis with associated urothelial thickening and debris within the left renal pelvis. The left kidney is otherwise unremarkable. The urinary bladder, uterus, and adnexa are unremarkable in CT appearance.  Mild colonic stool. No bowel obstruction or significant bowel wall thickening. Appendix is normal.  Trace free fluid in the pelvis is likely physiologic. No free intraperitoneal air. No pathologically enlarged lymph nodes in the abdomen or pelvis.  No acute osseous abnormality or concerning osseous lesion.      Moderate left pelviectasis with associated urothelial thickening and debris within the left renal pelvis, which could reflect pyonephrosis.  This report was finalized on 8/30/2024 7:31 PM by Cesar Kincaid MD on Workstation: [id number]         MEDICATIONS GIVEN IN ER  Medications   Lidocaine 4 % 1 patch ( Transdermal Dose Auto Held 9/7/24 0900)   cefTRIAXone (ROCEPHIN) 2,000 mg in sodium chloride 0.9 % 100 mL MBP (has no administration in time range)   sodium chloride 0.9 % flush 10 mL ( Intravenous Dose Auto Held 9/7/24 2100)   sodium chloride 0.9 % flush 10 mL ( Intravenous MAR Hold 8/30/24 2035)   sodium chloride 0.9 % infusion 40 mL ( Intravenous MAR Hold 8/30/24 2035)   acetaminophen (TYLENOL) tablet 650 mg ( Oral MAR Hold 8/30/24 2035)     Or   acetaminophen (TYLENOL) 160 MG/5ML oral solution 650 mg ( Oral MAR Hold 8/30/24 2035)     Or   acetaminophen (TYLENOL) suppository 650 mg ( Rectal MAR Hold 8/30/24 2035)   HYDROcodone-acetaminophen (NORCO) 5-325 MG per tablet 1  tablet ( Oral MAR Hold 8/30/24 2035)   sennosides-docusate (PERICOLACE) 8.6-50 MG per tablet 2 tablet ( Oral MAR Hold 8/30/24 2035)     And   polyethylene glycol (MIRALAX) packet 17 g ( Oral MAR Hold 8/30/24 2035)     And   bisacodyl (DULCOLAX) EC tablet 5 mg ( Oral MAR Hold 8/30/24 2035)     And   bisacodyl (DULCOLAX) suppository 10 mg ( Rectal MAR Hold 8/30/24 2035)   ondansetron ODT (ZOFRAN-ODT) disintegrating tablet 4 mg ( Oral MAR Hold 8/30/24 2035)     Or   ondansetron (ZOFRAN) injection 4 mg ( Intravenous MAR Hold 8/30/24 2035)   cefTRIAXone (ROCEPHIN) 2,000 mg in sodium chloride 0.9 % 100 mL MBP (has no administration in time range)   ketorolac (TORADOL) injection 15 mg (15 mg Intravenous Given 8/30/24 1634)   iopamidol (ISOVUE-300) 61 % injection 100 mL (85 mL Intravenous Given 8/30/24 1839)         ORDERS PLACED DURING THIS VISIT:  Orders Placed This Encounter   Procedures    Blood Culture - Blood,    Blood Culture - Blood,    Urine Culture - Urine, Urine, Clean Catch    CT Abdomen Pelvis With Contrast    FL Retrograde Pyelogram In OR    Comprehensive Metabolic Panel    Urinalysis With Microscopic If Indicated (No Culture) - Urine, Clean Catch    hCG, Serum, Qualitative    CBC Auto Differential    Urinalysis, Microscopic Only - Urine, Clean Catch    Lactic Acid, Plasma    Comprehensive Metabolic Panel    CBC Auto Differential    NPO Diet NPO Type: Sips with Meds, Ice Chips    Vital Signs    Intake & Output    Weigh Patient    Oral Care    Saline Lock & Maintain IV Access    Place Sequential Compression Device    Maintain Sequential Compression Device    Urology (on-call MD unless specified)    [facility] (on-call MD unless specified) Details    Inpatient Urology Consult    Insert Peripheral IV    Initiate Observation Status    CBC & Differential         OUTPATIENT MEDICATION MANAGEMENT:  Current Facility-Administered Medications Ordered in Epic   Medication Dose Route Frequency Provider Last Rate Last Admin     [Transfer Hold] acetaminophen (TYLENOL) tablet 650 mg  650 mg Oral Q4H PRN Jared Herrera MD        Or    [Transfer Hold] acetaminophen (TYLENOL) 160 MG/5ML oral solution 650 mg  650 mg Oral Q4H PRN Jared Herrera MD        Or    [Transfer Hold] acetaminophen (TYLENOL) suppository 650 mg  650 mg Rectal Q4H PRN Jared Herrera MD        [Transfer Hold] sennosides-docusate (PERICOLACE) 8.6-50 MG per tablet 2 tablet  2 tablet Oral BID PRN Jared Herrera MD        And    [Transfer Hold] polyethylene glycol (MIRALAX) packet 17 g  17 g Oral Daily PRN Jared Herrera MD        And    [Transfer Hold] bisacodyl (DULCOLAX) EC tablet 5 mg  5 mg Oral Daily PRN Jared Herrera MD        And    [Transfer Hold] bisacodyl (DULCOLAX) suppository 10 mg  10 mg Rectal Daily PRN Jared Herrera MD        cefTRIAXone (ROCEPHIN) 2,000 mg in sodium chloride 0.9 % 100 mL MBP  2,000 mg Intravenous Once Perez Zapata MD        [START ON 8/31/2024] cefTRIAXone (ROCEPHIN) 2,000 mg in sodium chloride 0.9 % 100 mL MBP  2,000 mg Intravenous Q24H Jared Herrera MD        [Transfer Hold] HYDROcodone-acetaminophen (NORCO) 5-325 MG per tablet 1 tablet  1 tablet Oral Q4H PRN Jared Herrera MD        [Transfer Hold] Lidocaine 4 % 1 patch  1 patch Transdermal Q24H Perez Zapata MD   1 patch at 08/30/24 1636    [Transfer Hold] ondansetron ODT (ZOFRAN-ODT) disintegrating tablet 4 mg  4 mg Oral Q6H PRN Jared Herrera MD        Or    [Transfer Hold] ondansetron (ZOFRAN) injection 4 mg  4 mg Intravenous Q6H PRN Jared Herrera MD        [Transfer Hold] sodium chloride 0.9 % flush 10 mL  10 mL Intravenous Q12H Jared Herrera MD        [Transfer Hold] sodium chloride 0.9 % flush 10 mL  10 mL Intravenous PRN Jared Herrera MD        [Transfer Hold] sodium chloride 0.9 % infusion 40 mL  40 mL Intravenous PRN Jared Herrera MD         No current [facility]-ordered outpatient medications on file.          PROCEDURES  Procedures            PROGRESS, DATA ANALYSIS, CONSULTS, AND MEDICAL DECISION MAKING  All labs have been independently interpreted by me.  All radiology studies have been reviewed by me. All EKG's have been independently viewed and interpreted by me.  Discussion below represents my analysis of pertinent findings related to patient's condition, differential diagnosis, treatment plan and final disposition.    Differential diagnosis includes but is not limited to muscle strain, sciatica, pyelonephritis, uti, medication side effect.    Clinical Scores:                   ED Course as of 08/30/24 2042   Fri Aug 30, 2024   1900 CT abdomen and pelvis interpreted by me and demonstrates left-sided hydronephrosis [MW]   1957 Laboratory evaluation is overall unremarkable with urinalysis that is not consistent with urinary tract infection and does demonstrate some contamination.  Radiological evaluation demonstrates findings concerning for pyelonephrosis.  Will obtain blood cultures and initiate antibiotic therapy.  Patient will require admission for further evaluation and management. [MW]   1958 Discussed with Dr. Fitzgerald of urology who agrees with plan for admission and will evaluate whether or not patient require stent placement. [MW]   2042 Discussed with Dr. Ng of [facility] who agrees to admit [MW]      ED Course User Index  [MW] Perez Zapata MD             AS OF 20:42 EDT VITALS:    BP - 127/75  HR - 82  TEMP - 98.7 °F (37.1 °C) (Oral)  O2 SATS - 99%    COMPLEXITY OF CARE  The patient requires admission.      DIAGNOSIS  Final diagnoses:   Pyonephrosis         DISPOSITION  ED Disposition       ED Disposition   Decision to Admit    Condition   --    Comment   Level of Care: Med/Surg [1]   Diagnosis: Pyonephrosis [[id number]]   Admitting Physician: MIS BERNABE [[id number]]   Attending Physician: MIS BERNABE [[id number]]                  Please note that portions of this document were completed with a  Anselmo voice recognition program.    Note Disclaimer: At [facility], we believe that sharing information builds trust and better relationships. You are receiving this note because you recently visited [facility]. It is possible you will see health information before a provider has talked with you about it. This kind of information can be easy to misunderstand. To help you fully understand what it means for your health, we urge you to discuss this note with your provider.         Perez Zapata MD  08/30/24 2048

## 2025-06-06 NOTE — H&P PST ADULT - LYMPHATIC
One week follow up call for New Patient appointment with   Kyra Saha PA-C   on 07/03/2025 was made on 06/06/2025. Writer informed patient of New Patient paperwork needing to be completed 5 days prior to the appointment. Writer confirmed paperwork has been sent via [employer].    Appointment was made on: 05/30/2025      
anterior cervical L/anterior cervical R